# Patient Record
Sex: FEMALE | Race: ASIAN | NOT HISPANIC OR LATINO | URBAN - METROPOLITAN AREA
[De-identification: names, ages, dates, MRNs, and addresses within clinical notes are randomized per-mention and may not be internally consistent; named-entity substitution may affect disease eponyms.]

---

## 2017-01-18 ENCOUNTER — INPATIENT (INPATIENT)
Facility: HOSPITAL | Age: 82
LOS: 4 days | Discharge: ANOTHER HOSPITAL | DRG: 291 | End: 2017-01-23
Attending: INTERNAL MEDICINE | Admitting: INTERNAL MEDICINE
Payer: MEDICARE

## 2017-01-18 VITALS
HEIGHT: 64 IN | DIASTOLIC BLOOD PRESSURE: 57 MMHG | WEIGHT: 149.91 LBS | TEMPERATURE: 97 F | SYSTOLIC BLOOD PRESSURE: 103 MMHG | RESPIRATION RATE: 16 BRPM | OXYGEN SATURATION: 95 % | HEART RATE: 63 BPM

## 2017-01-18 DIAGNOSIS — E11.8 TYPE 2 DIABETES MELLITUS WITH UNSPECIFIED COMPLICATIONS: ICD-10-CM

## 2017-01-18 DIAGNOSIS — S82.892A OTHER FRACTURE OF LEFT LOWER LEG, INITIAL ENCOUNTER FOR CLOSED FRACTURE: Chronic | ICD-10-CM

## 2017-01-18 DIAGNOSIS — I21.4 NON-ST ELEVATION (NSTEMI) MYOCARDIAL INFARCTION: ICD-10-CM

## 2017-01-18 DIAGNOSIS — S42.302S UNSPECIFIED FRACTURE OF SHAFT OF HUMERUS, LEFT ARM, SEQUELA: Chronic | ICD-10-CM

## 2017-01-18 DIAGNOSIS — M79.602 PAIN IN LEFT ARM: ICD-10-CM

## 2017-01-18 DIAGNOSIS — Z41.8 ENCOUNTER FOR OTHER PROCEDURES FOR PURPOSES OTHER THAN REMEDYING HEALTH STATE: ICD-10-CM

## 2017-01-18 DIAGNOSIS — N17.9 ACUTE KIDNEY FAILURE, UNSPECIFIED: ICD-10-CM

## 2017-01-18 DIAGNOSIS — I10 ESSENTIAL (PRIMARY) HYPERTENSION: ICD-10-CM

## 2017-01-18 DIAGNOSIS — H26.9 UNSPECIFIED CATARACT: Chronic | ICD-10-CM

## 2017-01-18 DIAGNOSIS — I50.9 HEART FAILURE, UNSPECIFIED: ICD-10-CM

## 2017-01-18 DIAGNOSIS — R74.0 NONSPECIFIC ELEVATION OF LEVELS OF TRANSAMINASE AND LACTIC ACID DEHYDROGENASE [LDH]: ICD-10-CM

## 2017-01-18 LAB
ALBUMIN SERPL ELPH-MCNC: 2.6 G/DL — LOW (ref 3.5–5)
ALP SERPL-CCNC: 36 U/L — LOW (ref 40–120)
ALT FLD-CCNC: 16 U/L DA — SIGNIFICANT CHANGE UP (ref 10–60)
ANION GAP SERPL CALC-SCNC: 12 MMOL/L — SIGNIFICANT CHANGE UP (ref 5–17)
APPEARANCE UR: CLEAR — SIGNIFICANT CHANGE UP
AST SERPL-CCNC: 43 U/L — HIGH (ref 10–40)
BASOPHILS # BLD AUTO: 0.1 K/UL — SIGNIFICANT CHANGE UP (ref 0–0.2)
BASOPHILS NFR BLD AUTO: 0.6 % — SIGNIFICANT CHANGE UP (ref 0–2)
BILIRUB DIRECT SERPL-MCNC: 0.3 MG/DL — HIGH (ref 0–0.2)
BILIRUB SERPL-MCNC: 1.4 MG/DL — HIGH (ref 0.2–1.2)
BILIRUB UR-MCNC: NEGATIVE — SIGNIFICANT CHANGE UP
BUN SERPL-MCNC: 21 MG/DL — HIGH (ref 7–18)
CALCIUM SERPL-MCNC: 8.6 MG/DL — SIGNIFICANT CHANGE UP (ref 8.4–10.5)
CHLORIDE SERPL-SCNC: 103 MMOL/L — SIGNIFICANT CHANGE UP (ref 96–108)
CK MB BLD-MCNC: <0.8 % — SIGNIFICANT CHANGE UP (ref 0–3.5)
CK MB BLD-MCNC: <1.3 % — SIGNIFICANT CHANGE UP (ref 0–3.5)
CK MB CFR SERPL CALC: <1 NG/ML — SIGNIFICANT CHANGE UP (ref 0–3.6)
CK MB CFR SERPL CALC: <1 NG/ML — SIGNIFICANT CHANGE UP (ref 0–3.6)
CK SERPL-CCNC: 128 U/L — SIGNIFICANT CHANGE UP (ref 21–215)
CK SERPL-CCNC: 79 U/L — SIGNIFICANT CHANGE UP (ref 21–215)
CO2 SERPL-SCNC: 20 MMOL/L — LOW (ref 22–31)
COLOR SPEC: YELLOW — SIGNIFICANT CHANGE UP
CREAT SERPL-MCNC: 1.7 MG/DL — HIGH (ref 0.5–1.3)
DIFF PNL FLD: ABNORMAL
EOSINOPHIL # BLD AUTO: 0.1 K/UL — SIGNIFICANT CHANGE UP (ref 0–0.5)
EOSINOPHIL NFR BLD AUTO: 0.4 % — SIGNIFICANT CHANGE UP (ref 0–6)
GLUCOSE SERPL-MCNC: 129 MG/DL — HIGH (ref 70–99)
GLUCOSE UR QL: NEGATIVE — SIGNIFICANT CHANGE UP
HCT VFR BLD CALC: 45.8 % — HIGH (ref 34.5–45)
HGB BLD-MCNC: 14.4 G/DL — SIGNIFICANT CHANGE UP (ref 11.5–15.5)
KETONES UR-MCNC: NEGATIVE — SIGNIFICANT CHANGE UP
LEUKOCYTE ESTERASE UR-ACNC: ABNORMAL
LYMPHOCYTES # BLD AUTO: 1.1 K/UL — SIGNIFICANT CHANGE UP (ref 1–3.3)
LYMPHOCYTES # BLD AUTO: 8.6 % — LOW (ref 13–44)
MCHC RBC-ENTMCNC: 28.4 PG — SIGNIFICANT CHANGE UP (ref 27–34)
MCHC RBC-ENTMCNC: 31.5 GM/DL — LOW (ref 32–36)
MCV RBC AUTO: 90 FL — SIGNIFICANT CHANGE UP (ref 80–100)
MONOCYTES # BLD AUTO: 1.1 K/UL — HIGH (ref 0–0.9)
MONOCYTES NFR BLD AUTO: 8.7 % — SIGNIFICANT CHANGE UP (ref 2–14)
NEUTROPHILS # BLD AUTO: 10.4 K/UL — HIGH (ref 1.8–7.4)
NEUTROPHILS NFR BLD AUTO: 81.7 % — HIGH (ref 43–77)
NITRITE UR-MCNC: NEGATIVE — SIGNIFICANT CHANGE UP
NT-PROBNP SERPL-SCNC: HIGH PG/ML (ref 0–450)
PH UR: 5 — SIGNIFICANT CHANGE UP (ref 4.8–8)
PLATELET # BLD AUTO: 312 K/UL — SIGNIFICANT CHANGE UP (ref 150–400)
POTASSIUM SERPL-MCNC: 4.7 MMOL/L — SIGNIFICANT CHANGE UP (ref 3.5–5.3)
POTASSIUM SERPL-SCNC: 4.7 MMOL/L — SIGNIFICANT CHANGE UP (ref 3.5–5.3)
PROT SERPL-MCNC: 6.7 G/DL — SIGNIFICANT CHANGE UP (ref 6–8.3)
PROT UR-MCNC: 15
RBC # BLD: 5.09 M/UL — SIGNIFICANT CHANGE UP (ref 3.8–5.2)
RBC # FLD: 13.3 % — SIGNIFICANT CHANGE UP (ref 10.3–14.5)
SODIUM SERPL-SCNC: 135 MMOL/L — SIGNIFICANT CHANGE UP (ref 135–145)
SP GR SPEC: 1.01 — SIGNIFICANT CHANGE UP (ref 1.01–1.02)
TROPONIN I SERPL-MCNC: 0.02 NG/ML — SIGNIFICANT CHANGE UP (ref 0–0.04)
TROPONIN I SERPL-MCNC: 0.04 NG/ML — SIGNIFICANT CHANGE UP (ref 0–0.04)
UROBILINOGEN FLD QL: NEGATIVE — SIGNIFICANT CHANGE UP
WBC # BLD: 12.7 K/UL — HIGH (ref 3.8–10.5)
WBC # FLD AUTO: 12.7 K/UL — HIGH (ref 3.8–10.5)

## 2017-01-18 PROCEDURE — 73060 X-RAY EXAM OF HUMERUS: CPT | Mod: 26,LT

## 2017-01-18 PROCEDURE — 70450 CT HEAD/BRAIN W/O DYE: CPT | Mod: 26

## 2017-01-18 PROCEDURE — 71010: CPT | Mod: 26

## 2017-01-18 PROCEDURE — 73030 X-RAY EXAM OF SHOULDER: CPT | Mod: 26,LT

## 2017-01-18 PROCEDURE — 73110 X-RAY EXAM OF WRIST: CPT | Mod: 26,RT

## 2017-01-18 PROCEDURE — 99285 EMERGENCY DEPT VISIT HI MDM: CPT

## 2017-01-18 RX ORDER — FUROSEMIDE 40 MG
20 TABLET ORAL EVERY 12 HOURS
Qty: 0 | Refills: 0 | Status: DISCONTINUED | OUTPATIENT
Start: 2017-01-18 | End: 2017-01-21

## 2017-01-18 RX ORDER — ACETAMINOPHEN 500 MG
650 TABLET ORAL EVERY 6 HOURS
Qty: 0 | Refills: 0 | Status: DISCONTINUED | OUTPATIENT
Start: 2017-01-18 | End: 2017-01-23

## 2017-01-18 RX ORDER — DEXTROSE 50 % IN WATER 50 %
25 SYRINGE (ML) INTRAVENOUS ONCE
Qty: 0 | Refills: 0 | Status: DISCONTINUED | OUTPATIENT
Start: 2017-01-18 | End: 2017-01-23

## 2017-01-18 RX ORDER — ATORVASTATIN CALCIUM 80 MG/1
20 TABLET, FILM COATED ORAL AT BEDTIME
Qty: 0 | Refills: 0 | Status: DISCONTINUED | OUTPATIENT
Start: 2017-01-18 | End: 2017-01-23

## 2017-01-18 RX ORDER — DEXTROSE 50 % IN WATER 50 %
1 SYRINGE (ML) INTRAVENOUS ONCE
Qty: 0 | Refills: 0 | Status: DISCONTINUED | OUTPATIENT
Start: 2017-01-18 | End: 2017-01-23

## 2017-01-18 RX ORDER — SODIUM CHLORIDE 9 MG/ML
3 INJECTION INTRAMUSCULAR; INTRAVENOUS; SUBCUTANEOUS ONCE
Qty: 0 | Refills: 0 | Status: COMPLETED | OUTPATIENT
Start: 2017-01-18 | End: 2017-01-18

## 2017-01-18 RX ORDER — FUROSEMIDE 40 MG
20 TABLET ORAL DAILY
Qty: 0 | Refills: 0 | Status: DISCONTINUED | OUTPATIENT
Start: 2017-01-18 | End: 2017-01-18

## 2017-01-18 RX ORDER — SODIUM CHLORIDE 9 MG/ML
1000 INJECTION, SOLUTION INTRAVENOUS
Qty: 0 | Refills: 0 | Status: DISCONTINUED | OUTPATIENT
Start: 2017-01-18 | End: 2017-01-23

## 2017-01-18 RX ORDER — HEPARIN SODIUM 5000 [USP'U]/ML
5000 INJECTION INTRAVENOUS; SUBCUTANEOUS EVERY 8 HOURS
Qty: 0 | Refills: 0 | Status: DISCONTINUED | OUTPATIENT
Start: 2017-01-18 | End: 2017-01-23

## 2017-01-18 RX ORDER — GLUCAGON INJECTION, SOLUTION 0.5 MG/.1ML
1 INJECTION, SOLUTION SUBCUTANEOUS ONCE
Qty: 0 | Refills: 0 | Status: DISCONTINUED | OUTPATIENT
Start: 2017-01-18 | End: 2017-01-23

## 2017-01-18 RX ORDER — INSULIN LISPRO 100/ML
VIAL (ML) SUBCUTANEOUS
Qty: 0 | Refills: 0 | Status: DISCONTINUED | OUTPATIENT
Start: 2017-01-18 | End: 2017-01-23

## 2017-01-18 RX ORDER — CARVEDILOL PHOSPHATE 80 MG/1
12.5 CAPSULE, EXTENDED RELEASE ORAL EVERY 12 HOURS
Qty: 0 | Refills: 0 | Status: DISCONTINUED | OUTPATIENT
Start: 2017-01-18 | End: 2017-01-23

## 2017-01-18 RX ORDER — DEXTROSE 50 % IN WATER 50 %
12.5 SYRINGE (ML) INTRAVENOUS ONCE
Qty: 0 | Refills: 0 | Status: DISCONTINUED | OUTPATIENT
Start: 2017-01-18 | End: 2017-01-23

## 2017-01-18 RX ORDER — FUROSEMIDE 40 MG
20 TABLET ORAL ONCE
Qty: 0 | Refills: 0 | Status: COMPLETED | OUTPATIENT
Start: 2017-01-18 | End: 2017-01-18

## 2017-01-18 RX ORDER — KETOROLAC TROMETHAMINE 30 MG/ML
15 SYRINGE (ML) INJECTION EVERY 6 HOURS
Qty: 0 | Refills: 0 | Status: DISCONTINUED | OUTPATIENT
Start: 2017-01-18 | End: 2017-01-19

## 2017-01-18 RX ORDER — ASPIRIN/CALCIUM CARB/MAGNESIUM 324 MG
81 TABLET ORAL DAILY
Qty: 0 | Refills: 0 | Status: DISCONTINUED | OUTPATIENT
Start: 2017-01-18 | End: 2017-01-23

## 2017-01-18 RX ADMIN — Medication 20 MILLIGRAM(S): at 15:33

## 2017-01-18 RX ADMIN — Medication 81 MILLIGRAM(S): at 19:04

## 2017-01-18 RX ADMIN — ATORVASTATIN CALCIUM 20 MILLIGRAM(S): 80 TABLET, FILM COATED ORAL at 23:19

## 2017-01-18 RX ADMIN — SODIUM CHLORIDE 3 MILLILITER(S): 9 INJECTION INTRAMUSCULAR; INTRAVENOUS; SUBCUTANEOUS at 13:03

## 2017-01-18 RX ADMIN — HEPARIN SODIUM 5000 UNIT(S): 5000 INJECTION INTRAVENOUS; SUBCUTANEOUS at 23:19

## 2017-01-18 RX ADMIN — Medication 15 MILLIGRAM(S): at 17:50

## 2017-01-18 RX ADMIN — Medication 15 MILLIGRAM(S): at 17:03

## 2017-01-18 RX ADMIN — CARVEDILOL PHOSPHATE 12.5 MILLIGRAM(S): 80 CAPSULE, EXTENDED RELEASE ORAL at 19:04

## 2017-01-18 RX ADMIN — Medication 20 MILLIGRAM(S): at 19:03

## 2017-01-18 NOTE — H&P ADULT. - PROBLEM SELECTOR PLAN 5
unlikely 2/2 statin myopathy as CK normal.  possibly 2/2 callus around fracture.  patient is nontender on exam, no erythema or warmth on exam  - pain management with toradol, hold on IV opiates given low normal BP

## 2017-01-18 NOTE — ED PROVIDER NOTE - PROGRESS NOTE DETAILS
Spoke to Dr. Howe, who told that if pt was medically clear, and no enzymes were made at the ED, pt needs to follow up w/ Dr. Howe.  reports the TWI is old. new labs reported to dr. shen, agree pt is to be admitted. Spoke to Dr. shen, who told that if pt was medically clear, and no enzymes were made at the ED, pt needs to follow up w/ Dr. Howe.  reports the TWI is old.

## 2017-01-18 NOTE — H&P ADULT. - PROBLEM SELECTOR PLAN 1
hypoxic, congestion on CXR, elevated BNP consistent with CHF. as per patient's cardiologist, Dr Holman, [phone], patient had stress test last year which showed EF60s?, no ischemia.  New onset CHF?, likely acute on chronic diastolic heart failure as patient with NICHOLS for 'long time' and orthopnea. possibly 2/2 NSTEMI with TWI in anterior leads.  Patient is responding to lasix 20mg with dilute-looking urine, not tachypneic, no respiratory distress, 96-97% sat on 2LNC  - pedraza, lasix 20mg IV q12  - telemetry, ASA, statin, c/w coreg, trend troponins  - TTE

## 2017-01-18 NOTE — H&P ADULT. - PROBLEM SELECTOR PLAN 2
unknown baseline creatinine, possibly 2/2 JOE on CKD as patient with HTN and DM (however low protein noted on UA), likely pre-renal 2/2 low flow state in CHF.    - c/w lasix IV q12, hold ARB  - send urine protein/creatinine ratio  - renal ultrasound   - Dr Chan consulted

## 2017-01-18 NOTE — ED PROVIDER NOTE - NS ED MD SCRIBE ATTENDING SCRIBE SECTIONS
PAST MEDICAL/SURGICAL/SOCIAL HISTORY/VITAL SIGNS( Pullset)/HISTORY OF PRESENT ILLNESS/REVIEW OF SYSTEMS/PHYSICAL EXAM/HIV/DISPOSITION

## 2017-01-18 NOTE — ED PROVIDER NOTE - CARE PLAN
Principal Discharge DX:	NSTEMI (non-ST elevated myocardial infarction)  Secondary Diagnosis:	Congestive heart failure

## 2017-01-18 NOTE — ED PROVIDER NOTE - PMH
DM (diabetes mellitus)    HLD (hyperlipidemia)    HTN (hypertension)    TIA (transient ischemic attack)

## 2017-01-18 NOTE — H&P ADULT. - HISTORY OF PRESENT ILLNESS
83yo female Mandarin and Cantonese-speaking from home, lives alone, walks with walker, former smoker, 2 sons at bedside to translate, PMH TIA (8 years ago, no residual deficits, on ASA and plavix), HTN, DM2, s/p left humerus fracture s/p roshan p/w left arm pain x 10 days and dry cough x 1 week. Patient went to urgent care today because left arm pain did not respond to OTC at home.  At urgent care, they noted that patient was hypoxic with 02 sat 85%, and patient BIBA.  As per ED physician who spoke with patient's cardiologist - patient had nuclear stress test last year which showed no reversible ischemia and EF 60s%.  ROS + orthopnea, NICHOLS. Denies fever, chills, changes in vision, nasal congestion, sinus pain, sore throat, chest pain, N/V, diarrhea, abdominal pain, dysuria, rectal bleeding.

## 2017-01-18 NOTE — ED PROVIDER NOTE - OBJECTIVE STATEMENT
81 y/o F pt w/ PMHx of HTN, HLD, DM, TIA 8 years ago, and arthritis, on Plavix BIB EMS to the ED s/p mechanical fall onset today. As per son, pt was initially c/o L UE pain x1 week. Pt was on her way to the Urgent Care today when pt had a mechanical fall and questionable head trauma. As per son, pt soon became short of breath and started to c/o pain to the R wrist after the fall. As the Urgent Care, pt was found to have an O2 stat in the 80s. Son reports, Pt had a benign nuclear stress test 6 months ago. Pt denies CP, nausea, vomiting, dizziness, HA or any other complaints. NKDA.

## 2017-01-18 NOTE — H&P ADULT. - ASSESSMENT
83yo female Mandarin and Cantonese-speaking from home, lives alone, walks with walker, former smoker, 2 sons at bedside to translate, PMH TIA (8 years ago, no residual deficits, on ASA and plavix), HTN, DM2, s/p left humerus fracture s/p roshan p/w left arm pain x 10 days, dry cough x 1 week, found to be hypoxic at urgent care likely 2/2 acute congestive heart failure

## 2017-01-18 NOTE — H&P ADULT. - PSH
Cataract, acquired    Fracture dislocation of left ankle joint    Fracture of left upper extremity, sequela

## 2017-01-18 NOTE — ED ADULT NURSE NOTE - OBJECTIVE STATEMENT
Pt AOx3. C/ of left arm pain, as per son his helping his mother outside. Pt tripped and fall. Pt is using rolling walker. No open wound to head. Denies loc. Pt AOx3. C/ of left arm pain, as per son his helping his mother outside. Pt tripped and fall. Pt is using rolling walker. No open wound to head. Pt reported injury on left arm 20 years ago. Denies loc.

## 2017-01-18 NOTE — ED PROVIDER NOTE - MEDICAL DECISION MAKING DETAILS
81 y/o F pt w/ PMHx of HTN, HLD, DM and TIA p/w SOB s/p mechanical fall w/ L UE pain and R wrist pain. Will check CT head given pt is on Plavix, XR R wrist, L hand, CXR and reassess.

## 2017-01-19 LAB
ALBUMIN SERPL ELPH-MCNC: 2.5 G/DL — LOW (ref 3.5–5)
ALP SERPL-CCNC: 33 U/L — LOW (ref 40–120)
ALT FLD-CCNC: 14 U/L DA — SIGNIFICANT CHANGE UP (ref 10–60)
ANION GAP SERPL CALC-SCNC: 10 MMOL/L — SIGNIFICANT CHANGE UP (ref 5–17)
AST SERPL-CCNC: 22 U/L — SIGNIFICANT CHANGE UP (ref 10–40)
BASOPHILS # BLD AUTO: 0.1 K/UL — SIGNIFICANT CHANGE UP (ref 0–0.2)
BASOPHILS NFR BLD AUTO: 0.8 % — SIGNIFICANT CHANGE UP (ref 0–2)
BILIRUB SERPL-MCNC: 1.1 MG/DL — SIGNIFICANT CHANGE UP (ref 0.2–1.2)
BUN SERPL-MCNC: 25 MG/DL — HIGH (ref 7–18)
CALCIUM SERPL-MCNC: 8.4 MG/DL — SIGNIFICANT CHANGE UP (ref 8.4–10.5)
CHLORIDE SERPL-SCNC: 104 MMOL/L — SIGNIFICANT CHANGE UP (ref 96–108)
CHOLEST SERPL-MCNC: 108 MG/DL — SIGNIFICANT CHANGE UP (ref 10–199)
CK MB BLD-MCNC: <1.7 % — SIGNIFICANT CHANGE UP (ref 0–3.5)
CK MB BLD-MCNC: <2.1 % — SIGNIFICANT CHANGE UP (ref 0–3.5)
CK MB CFR SERPL CALC: <1 NG/ML — SIGNIFICANT CHANGE UP (ref 0–3.6)
CK MB CFR SERPL CALC: <1 NG/ML — SIGNIFICANT CHANGE UP (ref 0–3.6)
CK SERPL-CCNC: 47 U/L — SIGNIFICANT CHANGE UP (ref 21–215)
CK SERPL-CCNC: 58 U/L — SIGNIFICANT CHANGE UP (ref 21–215)
CO2 SERPL-SCNC: 25 MMOL/L — SIGNIFICANT CHANGE UP (ref 22–31)
CREAT SERPL-MCNC: 1.7 MG/DL — HIGH (ref 0.5–1.3)
EOSINOPHIL # BLD AUTO: 0.2 K/UL — SIGNIFICANT CHANGE UP (ref 0–0.5)
EOSINOPHIL NFR BLD AUTO: 2.4 % — SIGNIFICANT CHANGE UP (ref 0–6)
FERRITIN SERPL-MCNC: 244.7 NG/ML — HIGH (ref 15–150)
GLUCOSE SERPL-MCNC: 74 MG/DL — SIGNIFICANT CHANGE UP (ref 70–99)
HBA1C BLD-MCNC: 6 % — HIGH (ref 4–5.6)
HBA1C BLD-MCNC: 6 % — HIGH (ref 4–5.6)
HCT VFR BLD CALC: 42.7 % — SIGNIFICANT CHANGE UP (ref 34.5–45)
HDLC SERPL-MCNC: 36 MG/DL — LOW (ref 40–125)
HGB BLD-MCNC: 13.8 G/DL — SIGNIFICANT CHANGE UP (ref 11.5–15.5)
IRON SATN MFR SERPL: 10 % — LOW (ref 15–50)
IRON SATN MFR SERPL: 23 UG/DL — LOW (ref 40–150)
LIPID PNL WITH DIRECT LDL SERPL: 43 MG/DL — SIGNIFICANT CHANGE UP
LYMPHOCYTES # BLD AUTO: 1.5 K/UL — SIGNIFICANT CHANGE UP (ref 1–3.3)
LYMPHOCYTES # BLD AUTO: 17.1 % — SIGNIFICANT CHANGE UP (ref 13–44)
MAGNESIUM SERPL-MCNC: 1.5 MG/DL — LOW (ref 1.8–2.4)
MCHC RBC-ENTMCNC: 28.6 PG — SIGNIFICANT CHANGE UP (ref 27–34)
MCHC RBC-ENTMCNC: 32.2 GM/DL — SIGNIFICANT CHANGE UP (ref 32–36)
MCV RBC AUTO: 88.6 FL — SIGNIFICANT CHANGE UP (ref 80–100)
MONOCYTES # BLD AUTO: 1.2 K/UL — HIGH (ref 0–0.9)
MONOCYTES NFR BLD AUTO: 13.3 % — SIGNIFICANT CHANGE UP (ref 2–14)
NEUTROPHILS # BLD AUTO: 5.8 K/UL — SIGNIFICANT CHANGE UP (ref 1.8–7.4)
NEUTROPHILS NFR BLD AUTO: 66.5 % — SIGNIFICANT CHANGE UP (ref 43–77)
PHOSPHATE SERPL-MCNC: 4.1 MG/DL — SIGNIFICANT CHANGE UP (ref 2.5–4.5)
PLATELET # BLD AUTO: 276 K/UL — SIGNIFICANT CHANGE UP (ref 150–400)
POTASSIUM SERPL-MCNC: 3.5 MMOL/L — SIGNIFICANT CHANGE UP (ref 3.5–5.3)
POTASSIUM SERPL-SCNC: 3.5 MMOL/L — SIGNIFICANT CHANGE UP (ref 3.5–5.3)
PROT SERPL-MCNC: 6.2 G/DL — SIGNIFICANT CHANGE UP (ref 6–8.3)
RBC # BLD: 4.51 M/UL — SIGNIFICANT CHANGE UP (ref 3.8–5.2)
RBC # BLD: 4.81 M/UL — SIGNIFICANT CHANGE UP (ref 3.8–5.2)
RBC # FLD: 13.3 % — SIGNIFICANT CHANGE UP (ref 10.3–14.5)
RETICS #: 46 K/UL — SIGNIFICANT CHANGE UP (ref 25–125)
RETICS/RBC NFR: 1 % — SIGNIFICANT CHANGE UP (ref 0.5–2.5)
SODIUM SERPL-SCNC: 139 MMOL/L — SIGNIFICANT CHANGE UP (ref 135–145)
TIBC SERPL-MCNC: 227 UG/DL — LOW (ref 250–450)
TOTAL CHOLESTEROL/HDL RATIO MEASUREMENT: 3 RATIO — LOW (ref 3.3–7.1)
TRIGL SERPL-MCNC: 145 MG/DL — SIGNIFICANT CHANGE UP (ref 10–149)
TROPONIN I SERPL-MCNC: 0.02 NG/ML — SIGNIFICANT CHANGE UP (ref 0–0.04)
TROPONIN I SERPL-MCNC: 0.02 NG/ML — SIGNIFICANT CHANGE UP (ref 0–0.04)
UIBC SERPL-MCNC: 204 UG/DL — SIGNIFICANT CHANGE UP (ref 110–370)
WBC # BLD: 8.6 K/UL — SIGNIFICANT CHANGE UP (ref 3.8–10.5)
WBC # FLD AUTO: 8.6 K/UL — SIGNIFICANT CHANGE UP (ref 3.8–10.5)

## 2017-01-19 PROCEDURE — 76775 US EXAM ABDO BACK WALL LIM: CPT | Mod: 26

## 2017-01-19 RX ORDER — CEFTRIAXONE 500 MG/1
INJECTION, POWDER, FOR SOLUTION INTRAMUSCULAR; INTRAVENOUS
Qty: 0 | Refills: 0 | Status: DISCONTINUED | OUTPATIENT
Start: 2017-01-19 | End: 2017-01-21

## 2017-01-19 RX ORDER — AZITHROMYCIN 500 MG/1
TABLET, FILM COATED ORAL
Qty: 0 | Refills: 0 | Status: DISCONTINUED | OUTPATIENT
Start: 2017-01-19 | End: 2017-01-21

## 2017-01-19 RX ORDER — POTASSIUM CHLORIDE 20 MEQ
20 PACKET (EA) ORAL
Qty: 0 | Refills: 0 | Status: COMPLETED | OUTPATIENT
Start: 2017-01-19 | End: 2017-01-19

## 2017-01-19 RX ORDER — TRAMADOL HYDROCHLORIDE 50 MG/1
50 TABLET ORAL
Qty: 0 | Refills: 0 | Status: DISCONTINUED | OUTPATIENT
Start: 2017-01-19 | End: 2017-01-23

## 2017-01-19 RX ORDER — CEFTRIAXONE 500 MG/1
1 INJECTION, POWDER, FOR SOLUTION INTRAMUSCULAR; INTRAVENOUS EVERY 24 HOURS
Qty: 0 | Refills: 0 | Status: DISCONTINUED | OUTPATIENT
Start: 2017-01-20 | End: 2017-01-21

## 2017-01-19 RX ORDER — MORPHINE SULFATE 50 MG/1
1 CAPSULE, EXTENDED RELEASE ORAL ONCE
Qty: 0 | Refills: 0 | Status: DISCONTINUED | OUTPATIENT
Start: 2017-01-19 | End: 2017-01-19

## 2017-01-19 RX ORDER — MAGNESIUM SULFATE 500 MG/ML
1 VIAL (ML) INJECTION ONCE
Qty: 0 | Refills: 0 | Status: COMPLETED | OUTPATIENT
Start: 2017-01-19 | End: 2017-01-19

## 2017-01-19 RX ORDER — AZITHROMYCIN 500 MG/1
500 TABLET, FILM COATED ORAL ONCE
Qty: 0 | Refills: 0 | Status: COMPLETED | OUTPATIENT
Start: 2017-01-19 | End: 2017-01-19

## 2017-01-19 RX ORDER — CEFTRIAXONE 500 MG/1
1 INJECTION, POWDER, FOR SOLUTION INTRAMUSCULAR; INTRAVENOUS ONCE
Qty: 0 | Refills: 0 | Status: COMPLETED | OUTPATIENT
Start: 2017-01-19 | End: 2017-01-19

## 2017-01-19 RX ORDER — AZITHROMYCIN 500 MG/1
500 TABLET, FILM COATED ORAL EVERY 24 HOURS
Qty: 0 | Refills: 0 | Status: DISCONTINUED | OUTPATIENT
Start: 2017-01-20 | End: 2017-01-21

## 2017-01-19 RX ADMIN — ATORVASTATIN CALCIUM 20 MILLIGRAM(S): 80 TABLET, FILM COATED ORAL at 21:49

## 2017-01-19 RX ADMIN — CARVEDILOL PHOSPHATE 12.5 MILLIGRAM(S): 80 CAPSULE, EXTENDED RELEASE ORAL at 18:07

## 2017-01-19 RX ADMIN — Medication 100 GRAM(S): at 11:25

## 2017-01-19 RX ADMIN — MORPHINE SULFATE 1 MILLIGRAM(S): 50 CAPSULE, EXTENDED RELEASE ORAL at 20:07

## 2017-01-19 RX ADMIN — HEPARIN SODIUM 5000 UNIT(S): 5000 INJECTION INTRAVENOUS; SUBCUTANEOUS at 06:01

## 2017-01-19 RX ADMIN — Medication 81 MILLIGRAM(S): at 13:00

## 2017-01-19 RX ADMIN — HEPARIN SODIUM 5000 UNIT(S): 5000 INJECTION INTRAVENOUS; SUBCUTANEOUS at 13:01

## 2017-01-19 RX ADMIN — Medication 20 MILLIEQUIVALENT(S): at 11:25

## 2017-01-19 RX ADMIN — MORPHINE SULFATE 1 MILLIGRAM(S): 50 CAPSULE, EXTENDED RELEASE ORAL at 20:30

## 2017-01-19 RX ADMIN — AZITHROMYCIN 250 MILLIGRAM(S): 500 TABLET, FILM COATED ORAL at 20:56

## 2017-01-19 RX ADMIN — HEPARIN SODIUM 5000 UNIT(S): 5000 INJECTION INTRAVENOUS; SUBCUTANEOUS at 21:49

## 2017-01-19 RX ADMIN — Medication 20 MILLIEQUIVALENT(S): at 13:01

## 2017-01-19 RX ADMIN — Medication 20 MILLIGRAM(S): at 18:07

## 2017-01-19 RX ADMIN — TRAMADOL HYDROCHLORIDE 50 MILLIGRAM(S): 50 TABLET ORAL at 19:44

## 2017-01-19 RX ADMIN — CEFTRIAXONE 100 GRAM(S): 500 INJECTION, POWDER, FOR SOLUTION INTRAMUSCULAR; INTRAVENOUS at 20:55

## 2017-01-19 RX ADMIN — Medication 20 MILLIGRAM(S): at 06:00

## 2017-01-19 RX ADMIN — CARVEDILOL PHOSPHATE 12.5 MILLIGRAM(S): 80 CAPSULE, EXTENDED RELEASE ORAL at 06:00

## 2017-01-19 RX ADMIN — TRAMADOL HYDROCHLORIDE 50 MILLIGRAM(S): 50 TABLET ORAL at 18:39

## 2017-01-19 RX ADMIN — TRAMADOL HYDROCHLORIDE 50 MILLIGRAM(S): 50 TABLET ORAL at 11:25

## 2017-01-19 RX ADMIN — TRAMADOL HYDROCHLORIDE 50 MILLIGRAM(S): 50 TABLET ORAL at 12:10

## 2017-01-19 NOTE — DISCHARGE NOTE ADULT - HOSPITAL COURSE
83yo female Mandarin and Cantonese-speaking from home, lives alone, walks with walker, former smoker, 2 sons at bedside to translate, PMH TIA (8 years ago, no residual deficits, on ASA and plavix), HTN, DM2, s/p left humerus fracture s/p roshan p/w left arm pain x 10 days and dry cough x 1 week. Patient went to urgent care today because left arm pain did not respond to OTC at home.  At urgent care, they noted that patient was hypoxic with 02 sat 85%, and patient BIBA.  As per ED physician who spoke with patient's cardiologist - patient had nuclear stress test last year which showed no reversible ischemia and EF 60s%.  ROS + orthopnea, NICHOLS. Denies fever, chills, changes in vision, nasal congestion, sinus pain, sore throat, chest pain, N/V, diarrhea, abdominal pain, dysuria, rectal bleeding.     In the hospital, patient was evaluated for her symptoms. It was found to be secondary to congestive heart failure with preserved ejection fraction. You were diuresed with lasix. Telemetry showed no evidence of cardiac arrhythmias. You were medically managed with losartan, coreg and statin. However, on echocardiogram you were found to have severe pulmonary hypertension which was not present on echocardiogram or stress test performed 1.5 years ago. Due to the significant changes, patient will be transferred to [facility] for cardiac catheterization. There was concern for infection due to history of pt. Patient started on Rocephin/Azithromycin but CT scan showed no evidence of consolidation. Patient was positive UA but was asymptomatic therefore it was asymptomatic bacturia. BP was stable on meds. Ortho was consulted for the Left arm which had a hardware failure but arm was healing and there was no need for repeat surgical intervention. Patient is stable for transfer.

## 2017-01-19 NOTE — DISCHARGE NOTE ADULT - NS AS DC HF EDUCATION INSTRUCTIONS
Report weight gain of 2 or more pounds in one day or 3 or more pounds in one week, worsening shortness of breath, fatigue, weakness, increased swelling of hands and feet to primary care provider/Monitor Weight Daily/Low salt diet/Activities as tolerated/Call Primary Care Provider for follow-up after discharge

## 2017-01-19 NOTE — PHYSICAL THERAPY INITIAL EVALUATION ADULT - CRITERIA FOR SKILLED THERAPEUTIC INTERVENTIONS
rehab potential/functional limitations in following categories/risk reduction/prevention/impairments found

## 2017-01-19 NOTE — DISCHARGE NOTE ADULT - PLAN OF CARE
To get cardiac cath at [place] Please get cardiac cath at [facility] to evaluate pulmonary hypertension and recent change in cardiac function. No more lasix diuresis for now, follow up with reccs of cardiologist at [facility]. Please continue with losartan, coreg, statin. Resolved You had some acute kidney injury secondary to heart failure, it was resolved with lasix diuresis. Please monitor kidney function at [facility] Control blood pressure Stay on current BP regimen and f/u cardio recs at [facility] Stable fx Take pain control. The hardware failed but your fracture is healing. Please be careful with the use of your left arm. No need for surgical intervention at this time. Control blood sugar Continue to use humalog sliding scale at the hospital and resume Janumet on discharge.

## 2017-01-19 NOTE — DISCHARGE NOTE ADULT - SECONDARY DIAGNOSIS.
JOE (acute kidney injury) HTN (hypertension) Fracture of left upper extremity, sequela DM (diabetes mellitus)

## 2017-01-19 NOTE — DISCHARGE NOTE ADULT - MEDICATION SUMMARY - MEDICATIONS TO STOP TAKING
I will STOP taking the medications listed below when I get home from the hospital:    fenofibrate 67 mg oral capsule  -- 1 cap(s) by mouth once a day    Nifedical XL 30 mg oral tablet, extended release  -- 1 tab(s) by mouth once a day    simvastatin 40 mg oral tablet  -- 1 tab(s) by mouth once a day (at bedtime)

## 2017-01-19 NOTE — DISCHARGE NOTE ADULT - PATIENT PORTAL LINK FT
“You can access the Follow[employer] Patient Portal, offered by [facility], by registering with the following website: [email or web address]”

## 2017-01-19 NOTE — DISCHARGE NOTE ADULT - MEDICATION SUMMARY - MEDICATIONS TO TAKE
I will START or STAY ON the medications listed below when I get home from the hospital:    acetaminophen 325 mg oral tablet  -- 2 tab(s) by mouth every 6 hours, As needed, Mild Pain (1 - 3)  -- Indication: For Pain of left upper extremity    traMADol 50 mg oral tablet  -- 1 tab(s) by mouth 2 times a day, As needed, Moderate Pain (4 - 6)  -- Indication: For Pain of left upper extremity    aspirin 81 mg oral tablet, chewable  -- 1 tab(s) by mouth once a day  -- Indication: For CHF    losartan 25 mg oral tablet  -- 1 tab(s) by mouth once a day  -- Indication: For CHF    insulin lispro 100 units/mL subcutaneous solution  --  subcutaneous 4 times a day (before meals and at bedtime); 1 Unit(s) if Glucose 151 - 200  2 Unit(s) if Glucose 201 - 250  3 Unit(s) if Glucose 251 - 300  4 Unit(s) if Glucose 301 - 350  5 Unit(s) if Glucose 351 - 400  6 Unit(s) if Glucose GREATER THAN 400  -- Indication: For DM (diabetes mellitus)    atorvastatin 20 mg oral tablet  -- 1 tab(s) by mouth once a day (at bedtime)  -- Indication: For Congestive heart failure    Plavix 75 mg oral tablet  -- 1 tab(s) by mouth once a day  -- Indication: For Congestive heart failure    carvedilol 12.5 mg oral tablet  -- 1 tab(s) by mouth every 12 hours  -- Indication: For Congestive heart failure    amLODIPine 2.5 mg oral tablet  -- 1 tab(s) by mouth once a day  -- Indication: For Pulm HTN    ferrous sulfate 325 mg (65 mg elemental iron) oral tablet  -- 1 tab(s) by mouth 2 times a day  -- Indication: For Anemia    docusate sodium 100 mg oral capsule  -- 1 cap(s) by mouth 2 times a day  -- Indication: For Anemia    pantoprazole 40 mg oral delayed release tablet  -- 1 tab(s) by mouth once a day (before a meal)  -- Indication: For Prophylactic measure    ascorbic acid 500 mg oral tablet  -- 1 tab(s) by mouth once a day  -- Indication: For Anemia

## 2017-01-19 NOTE — DISCHARGE NOTE ADULT - CARE PLAN
Principal Discharge DX:	Congestive heart failure  Goal:	To get cardiac cath at [place]  Instructions for follow-up, activity and diet:	Please get cardiac cath at [facility] to evaluate pulmonary hypertension and recent change in cardiac function. No more lasix diuresis for now, follow up with reccs of cardiologist at [facility]. Please continue with losartan, coreg, statin.  Secondary Diagnosis:	JOE (acute kidney injury)  Goal:	Resolved  Instructions for follow-up, activity and diet:	You had some acute kidney injury secondary to heart failure, it was resolved with lasix diuresis. Please monitor kidney function at [facility]  Secondary Diagnosis:	HTN (hypertension)  Goal:	Control blood pressure  Instructions for follow-up, activity and diet:	Stay on current BP regimen and f/u cardio recs at [facility]  Secondary Diagnosis:	Fracture of left upper extremity, sequela  Goal:	Stable fx  Instructions for follow-up, activity and diet:	Take pain control. The hardware failed but your fracture is healing. Please be careful with the use of your left arm. No need for surgical intervention at this time.  Secondary Diagnosis:	DM (diabetes mellitus)  Goal:	Control blood sugar  Instructions for follow-up, activity and diet:	Continue to use humalog sliding scale at the hospital and resume Janumet on discharge.

## 2017-01-19 NOTE — DISCHARGE NOTE ADULT - CARE PROVIDER_API CALL
Gena Daniels), Cardiology; Internal Medicine  [address] [place] [id number]  Phone: [phone]  Fax: [phone]

## 2017-01-19 NOTE — PHYSICAL THERAPY INITIAL EVALUATION ADULT - ACTIVE RANGE OF MOTION EXAMINATION, REHAB EVAL
bilateral  lower extremity Active ROM was WFL (within functional limits)/L shoulder flexion 0 to 30 degrees,abduction 0 to 25 degrees/Right UE Active ROM was WFL (within functional limits)/deficits as listed below

## 2017-01-19 NOTE — DISCHARGE NOTE ADULT - MEDICATION SUMMARY - MEDICATIONS TO CHANGE
I will SWITCH the dose or number of times a day I take the medications listed below when I get home from the hospital:    losartan 100 mg oral tablet  -- 1 tab(s) by mouth once a day

## 2017-01-19 NOTE — PHYSICAL THERAPY INITIAL EVALUATION ADULT - DIAGNOSIS, PT EVAL
Patient presented with pain on L shoulder,limitation on L shoulder ROM for flexion and abduction,impaired balance and was unsteady during ambulation

## 2017-01-20 LAB
ALBUMIN SERPL ELPH-MCNC: 2.6 G/DL — LOW (ref 3.5–5)
ALP SERPL-CCNC: 39 U/L — LOW (ref 40–120)
ALT FLD-CCNC: 14 U/L DA — SIGNIFICANT CHANGE UP (ref 10–60)
ANION GAP SERPL CALC-SCNC: 10 MMOL/L — SIGNIFICANT CHANGE UP (ref 5–17)
APPEARANCE UR: CLEAR — SIGNIFICANT CHANGE UP
AST SERPL-CCNC: 26 U/L — SIGNIFICANT CHANGE UP (ref 10–40)
BILIRUB SERPL-MCNC: 0.8 MG/DL — SIGNIFICANT CHANGE UP (ref 0.2–1.2)
BILIRUB UR-MCNC: NEGATIVE — SIGNIFICANT CHANGE UP
BUN SERPL-MCNC: 25 MG/DL — HIGH (ref 7–18)
CALCIUM SERPL-MCNC: 8.7 MG/DL — SIGNIFICANT CHANGE UP (ref 8.4–10.5)
CHLORIDE SERPL-SCNC: 102 MMOL/L — SIGNIFICANT CHANGE UP (ref 96–108)
CO2 SERPL-SCNC: 26 MMOL/L — SIGNIFICANT CHANGE UP (ref 22–31)
COLOR SPEC: YELLOW — SIGNIFICANT CHANGE UP
CREAT ?TM UR-MCNC: 25 MG/DL — SIGNIFICANT CHANGE UP
CREAT ?TM UR-MCNC: 46 MG/DL — SIGNIFICANT CHANGE UP
CREAT SERPL-MCNC: 1.48 MG/DL — HIGH (ref 0.5–1.3)
DIFF PNL FLD: ABNORMAL
GLUCOSE SERPL-MCNC: 80 MG/DL — SIGNIFICANT CHANGE UP (ref 70–99)
GLUCOSE UR QL: NEGATIVE — SIGNIFICANT CHANGE UP
KETONES UR-MCNC: NEGATIVE — SIGNIFICANT CHANGE UP
LEUKOCYTE ESTERASE UR-ACNC: ABNORMAL
MICROALBUMIN UR-MCNC: 6.1 MG/DL — SIGNIFICANT CHANGE UP
MICROALBUMIN/CREAT UR-RTO: 248 UG/MG — HIGH (ref 0–30)
NITRITE UR-MCNC: NEGATIVE — SIGNIFICANT CHANGE UP
PH UR: 5 — SIGNIFICANT CHANGE UP (ref 4.8–8)
POTASSIUM SERPL-MCNC: 4 MMOL/L — SIGNIFICANT CHANGE UP (ref 3.5–5.3)
POTASSIUM SERPL-SCNC: 4 MMOL/L — SIGNIFICANT CHANGE UP (ref 3.5–5.3)
PROT ?TM UR-MCNC: 12 MG/DL — SIGNIFICANT CHANGE UP (ref 0–12)
PROT SERPL-MCNC: 6 G/DL — SIGNIFICANT CHANGE UP (ref 6–8.3)
PROT SERPL-MCNC: 6 G/DL — SIGNIFICANT CHANGE UP (ref 6–8.3)
PROT SERPL-MCNC: 6.7 G/DL — SIGNIFICANT CHANGE UP (ref 6–8.3)
PROT UR-MCNC: NEGATIVE — SIGNIFICANT CHANGE UP
SODIUM SERPL-SCNC: 138 MMOL/L — SIGNIFICANT CHANGE UP (ref 135–145)
SP GR SPEC: 1.01 — SIGNIFICANT CHANGE UP (ref 1.01–1.02)
TSH SERPL-MCNC: 2.26 UU/ML — SIGNIFICANT CHANGE UP (ref 0.34–4.82)
UROBILINOGEN FLD QL: NEGATIVE — SIGNIFICANT CHANGE UP
VIT D25+D1,25 OH+D1,25 PNL SERPL-MCNC: 49 PG/ML — SIGNIFICANT CHANGE UP (ref 19.9–79.3)

## 2017-01-20 PROCEDURE — 71250 CT THORAX DX C-: CPT | Mod: 26

## 2017-01-20 RX ORDER — ASCORBIC ACID 60 MG
500 TABLET,CHEWABLE ORAL DAILY
Qty: 0 | Refills: 0 | Status: DISCONTINUED | OUTPATIENT
Start: 2017-01-20 | End: 2017-01-23

## 2017-01-20 RX ORDER — DOCUSATE SODIUM 100 MG
100 CAPSULE ORAL
Qty: 0 | Refills: 0 | Status: DISCONTINUED | OUTPATIENT
Start: 2017-01-20 | End: 2017-01-23

## 2017-01-20 RX ORDER — PANTOPRAZOLE SODIUM 20 MG/1
40 TABLET, DELAYED RELEASE ORAL
Qty: 0 | Refills: 0 | Status: DISCONTINUED | OUTPATIENT
Start: 2017-01-20 | End: 2017-01-23

## 2017-01-20 RX ORDER — FERROUS SULFATE 325(65) MG
325 TABLET ORAL
Qty: 0 | Refills: 0 | Status: DISCONTINUED | OUTPATIENT
Start: 2017-01-20 | End: 2017-01-23

## 2017-01-20 RX ADMIN — CARVEDILOL PHOSPHATE 12.5 MILLIGRAM(S): 80 CAPSULE, EXTENDED RELEASE ORAL at 17:44

## 2017-01-20 RX ADMIN — AZITHROMYCIN 250 MILLIGRAM(S): 500 TABLET, FILM COATED ORAL at 21:32

## 2017-01-20 RX ADMIN — CARVEDILOL PHOSPHATE 12.5 MILLIGRAM(S): 80 CAPSULE, EXTENDED RELEASE ORAL at 06:20

## 2017-01-20 RX ADMIN — Medication 325 MILLIGRAM(S): at 17:44

## 2017-01-20 RX ADMIN — Medication 500 MILLIGRAM(S): at 12:35

## 2017-01-20 RX ADMIN — HEPARIN SODIUM 5000 UNIT(S): 5000 INJECTION INTRAVENOUS; SUBCUTANEOUS at 15:07

## 2017-01-20 RX ADMIN — Medication 20 MILLIGRAM(S): at 17:44

## 2017-01-20 RX ADMIN — CEFTRIAXONE 100 GRAM(S): 500 INJECTION, POWDER, FOR SOLUTION INTRAMUSCULAR; INTRAVENOUS at 21:32

## 2017-01-20 RX ADMIN — HEPARIN SODIUM 5000 UNIT(S): 5000 INJECTION INTRAVENOUS; SUBCUTANEOUS at 21:32

## 2017-01-20 RX ADMIN — Medication 81 MILLIGRAM(S): at 12:35

## 2017-01-20 RX ADMIN — ATORVASTATIN CALCIUM 20 MILLIGRAM(S): 80 TABLET, FILM COATED ORAL at 21:32

## 2017-01-20 RX ADMIN — Medication 20 MILLIGRAM(S): at 06:20

## 2017-01-20 RX ADMIN — HEPARIN SODIUM 5000 UNIT(S): 5000 INJECTION INTRAVENOUS; SUBCUTANEOUS at 06:20

## 2017-01-20 RX ADMIN — Medication 100 MILLIGRAM(S): at 17:44

## 2017-01-21 LAB
ALBUMIN SERPL ELPH-MCNC: 2.6 G/DL — LOW (ref 3.5–5)
ALP SERPL-CCNC: 41 U/L — SIGNIFICANT CHANGE UP (ref 40–120)
ALT FLD-CCNC: 12 U/L DA — SIGNIFICANT CHANGE UP (ref 10–60)
ANION GAP SERPL CALC-SCNC: 10 MMOL/L — SIGNIFICANT CHANGE UP (ref 5–17)
AST SERPL-CCNC: 21 U/L — SIGNIFICANT CHANGE UP (ref 10–40)
BILIRUB SERPL-MCNC: 0.9 MG/DL — SIGNIFICANT CHANGE UP (ref 0.2–1.2)
BUN SERPL-MCNC: 21 MG/DL — HIGH (ref 7–18)
CALCIUM SERPL-MCNC: 8.7 MG/DL — SIGNIFICANT CHANGE UP (ref 8.4–10.5)
CHLORIDE SERPL-SCNC: 99 MMOL/L — SIGNIFICANT CHANGE UP (ref 96–108)
CO2 SERPL-SCNC: 27 MMOL/L — SIGNIFICANT CHANGE UP (ref 22–31)
CREAT SERPL-MCNC: 1.29 MG/DL — SIGNIFICANT CHANGE UP (ref 0.5–1.3)
FLUAV SPEC QL CULT: NEGATIVE — SIGNIFICANT CHANGE UP
FLUBV AG SPEC QL IA: NEGATIVE — SIGNIFICANT CHANGE UP
GLUCOSE SERPL-MCNC: 102 MG/DL — HIGH (ref 70–99)
GRAM STN FLD: SIGNIFICANT CHANGE UP
HCT VFR BLD CALC: 45.7 % — HIGH (ref 34.5–45)
HGB BLD-MCNC: 14.2 G/DL — SIGNIFICANT CHANGE UP (ref 11.5–15.5)
MAGNESIUM SERPL-MCNC: 1.5 MG/DL — LOW (ref 1.8–2.4)
MCHC RBC-ENTMCNC: 28.3 PG — SIGNIFICANT CHANGE UP (ref 27–34)
MCHC RBC-ENTMCNC: 31.1 GM/DL — LOW (ref 32–36)
MCV RBC AUTO: 91 FL — SIGNIFICANT CHANGE UP (ref 80–100)
PHOSPHATE SERPL-MCNC: 2.7 MG/DL — SIGNIFICANT CHANGE UP (ref 2.5–4.5)
PLATELET # BLD AUTO: 383 K/UL — SIGNIFICANT CHANGE UP (ref 150–400)
POTASSIUM SERPL-MCNC: 3.9 MMOL/L — SIGNIFICANT CHANGE UP (ref 3.5–5.3)
POTASSIUM SERPL-SCNC: 3.9 MMOL/L — SIGNIFICANT CHANGE UP (ref 3.5–5.3)
PROT SERPL-MCNC: 6.8 G/DL — SIGNIFICANT CHANGE UP (ref 6–8.3)
RAPID RVP RESULT: SIGNIFICANT CHANGE UP
RBC # BLD: 5.03 M/UL — SIGNIFICANT CHANGE UP (ref 3.8–5.2)
RBC # FLD: 12.9 % — SIGNIFICANT CHANGE UP (ref 10.3–14.5)
SODIUM SERPL-SCNC: 136 MMOL/L — SIGNIFICANT CHANGE UP (ref 135–145)
SPECIMEN SOURCE: SIGNIFICANT CHANGE UP
VIT B12 SERPL-MCNC: 1177 PG/ML — HIGH (ref 243–894)
WBC # BLD: 8.6 K/UL — SIGNIFICANT CHANGE UP (ref 3.8–10.5)
WBC # FLD AUTO: 8.6 K/UL — SIGNIFICANT CHANGE UP (ref 3.8–10.5)

## 2017-01-21 RX ORDER — FUROSEMIDE 40 MG
20 TABLET ORAL DAILY
Qty: 0 | Refills: 0 | Status: DISCONTINUED | OUTPATIENT
Start: 2017-01-21 | End: 2017-01-22

## 2017-01-21 RX ORDER — AMLODIPINE BESYLATE 2.5 MG/1
2.5 TABLET ORAL DAILY
Qty: 0 | Refills: 0 | Status: DISCONTINUED | OUTPATIENT
Start: 2017-01-21 | End: 2017-01-23

## 2017-01-21 RX ORDER — MAGNESIUM SULFATE 500 MG/ML
1 VIAL (ML) INJECTION ONCE
Qty: 0 | Refills: 0 | Status: COMPLETED | OUTPATIENT
Start: 2017-01-21 | End: 2017-01-21

## 2017-01-21 RX ORDER — LOSARTAN POTASSIUM 100 MG/1
25 TABLET, FILM COATED ORAL DAILY
Qty: 0 | Refills: 0 | Status: DISCONTINUED | OUTPATIENT
Start: 2017-01-21 | End: 2017-01-23

## 2017-01-21 RX ORDER — CEFTRIAXONE 500 MG/1
1 INJECTION, POWDER, FOR SOLUTION INTRAMUSCULAR; INTRAVENOUS EVERY 24 HOURS
Qty: 0 | Refills: 0 | Status: DISCONTINUED | OUTPATIENT
Start: 2017-01-21 | End: 2017-01-22

## 2017-01-21 RX ADMIN — PANTOPRAZOLE SODIUM 40 MILLIGRAM(S): 20 TABLET, DELAYED RELEASE ORAL at 07:19

## 2017-01-21 RX ADMIN — CEFTRIAXONE 100 GRAM(S): 500 INJECTION, POWDER, FOR SOLUTION INTRAMUSCULAR; INTRAVENOUS at 22:03

## 2017-01-21 RX ADMIN — CARVEDILOL PHOSPHATE 12.5 MILLIGRAM(S): 80 CAPSULE, EXTENDED RELEASE ORAL at 17:06

## 2017-01-21 RX ADMIN — Medication 100 MILLIGRAM(S): at 17:06

## 2017-01-21 RX ADMIN — CARVEDILOL PHOSPHATE 12.5 MILLIGRAM(S): 80 CAPSULE, EXTENDED RELEASE ORAL at 05:51

## 2017-01-21 RX ADMIN — Medication 20 MILLIGRAM(S): at 11:58

## 2017-01-21 RX ADMIN — ATORVASTATIN CALCIUM 20 MILLIGRAM(S): 80 TABLET, FILM COATED ORAL at 22:02

## 2017-01-21 RX ADMIN — HEPARIN SODIUM 5000 UNIT(S): 5000 INJECTION INTRAVENOUS; SUBCUTANEOUS at 14:40

## 2017-01-21 RX ADMIN — HEPARIN SODIUM 5000 UNIT(S): 5000 INJECTION INTRAVENOUS; SUBCUTANEOUS at 05:51

## 2017-01-21 RX ADMIN — LOSARTAN POTASSIUM 25 MILLIGRAM(S): 100 TABLET, FILM COATED ORAL at 17:06

## 2017-01-21 RX ADMIN — Medication 500 MILLIGRAM(S): at 11:56

## 2017-01-21 RX ADMIN — Medication 20 MILLIGRAM(S): at 05:51

## 2017-01-21 RX ADMIN — HEPARIN SODIUM 5000 UNIT(S): 5000 INJECTION INTRAVENOUS; SUBCUTANEOUS at 22:02

## 2017-01-21 RX ADMIN — Medication 100 GRAM(S): at 11:57

## 2017-01-21 RX ADMIN — Medication 325 MILLIGRAM(S): at 17:06

## 2017-01-21 RX ADMIN — Medication 100 MILLIGRAM(S): at 05:51

## 2017-01-21 RX ADMIN — Medication 325 MILLIGRAM(S): at 11:56

## 2017-01-21 RX ADMIN — Medication 81 MILLIGRAM(S): at 11:57

## 2017-01-22 LAB
% ALBUMIN: 51.8 % — SIGNIFICANT CHANGE UP
% ALPHA 1: 5.3 % — SIGNIFICANT CHANGE UP
% ALPHA 2: 11.8 % — SIGNIFICANT CHANGE UP
% BETA: 13 % — SIGNIFICANT CHANGE UP
% GAMMA: 18.1 % — SIGNIFICANT CHANGE UP
ALBUMIN SERPL ELPH-MCNC: 3.1 G/DL — LOW (ref 3.6–5.5)
ALBUMIN/GLOB SERPL ELPH: 1.1 RATIO — SIGNIFICANT CHANGE UP
ALPHA1 GLOB SERPL ELPH-MCNC: 0.3 G/DL — SIGNIFICANT CHANGE UP (ref 0.1–0.4)
ALPHA2 GLOB SERPL ELPH-MCNC: 0.7 G/DL — SIGNIFICANT CHANGE UP (ref 0.5–1)
B-GLOBULIN SERPL ELPH-MCNC: 0.8 G/DL — SIGNIFICANT CHANGE UP (ref 0.5–1)
GAMMA GLOBULIN: 1.1 G/DL — SIGNIFICANT CHANGE UP (ref 0.6–1.6)
LEGIONELLA AG UR QL: NEGATIVE — SIGNIFICANT CHANGE UP
PROT PATTERN SERPL ELPH-IMP: SIGNIFICANT CHANGE UP

## 2017-01-22 RX ADMIN — ATORVASTATIN CALCIUM 20 MILLIGRAM(S): 80 TABLET, FILM COATED ORAL at 22:16

## 2017-01-22 RX ADMIN — HEPARIN SODIUM 5000 UNIT(S): 5000 INJECTION INTRAVENOUS; SUBCUTANEOUS at 05:31

## 2017-01-22 RX ADMIN — PANTOPRAZOLE SODIUM 40 MILLIGRAM(S): 20 TABLET, DELAYED RELEASE ORAL at 06:02

## 2017-01-22 RX ADMIN — Medication 500 MILLIGRAM(S): at 11:41

## 2017-01-22 RX ADMIN — LOSARTAN POTASSIUM 25 MILLIGRAM(S): 100 TABLET, FILM COATED ORAL at 05:34

## 2017-01-22 RX ADMIN — Medication 325 MILLIGRAM(S): at 17:10

## 2017-01-22 RX ADMIN — Medication 20 MILLIGRAM(S): at 05:31

## 2017-01-22 RX ADMIN — Medication 81 MILLIGRAM(S): at 11:41

## 2017-01-22 RX ADMIN — HEPARIN SODIUM 5000 UNIT(S): 5000 INJECTION INTRAVENOUS; SUBCUTANEOUS at 22:16

## 2017-01-22 RX ADMIN — Medication 100 MILLIGRAM(S): at 05:35

## 2017-01-22 RX ADMIN — Medication 100 MILLIGRAM(S): at 17:10

## 2017-01-22 RX ADMIN — AMLODIPINE BESYLATE 2.5 MILLIGRAM(S): 2.5 TABLET ORAL at 05:31

## 2017-01-22 RX ADMIN — HEPARIN SODIUM 5000 UNIT(S): 5000 INJECTION INTRAVENOUS; SUBCUTANEOUS at 14:01

## 2017-01-22 RX ADMIN — CARVEDILOL PHOSPHATE 12.5 MILLIGRAM(S): 80 CAPSULE, EXTENDED RELEASE ORAL at 17:10

## 2017-01-22 RX ADMIN — Medication 325 MILLIGRAM(S): at 08:38

## 2017-01-23 ENCOUNTER — INPATIENT (INPATIENT)
Facility: HOSPITAL | Age: 82
LOS: 0 days | Discharge: HOME | DRG: 287 | End: 2017-01-24
Attending: INTERNAL MEDICINE | Admitting: INTERNAL MEDICINE
Payer: MEDICARE

## 2017-01-23 VITALS
SYSTOLIC BLOOD PRESSURE: 147 MMHG | RESPIRATION RATE: 20 BRPM | WEIGHT: 142.2 LBS | HEART RATE: 66 BPM | TEMPERATURE: 98 F | OXYGEN SATURATION: 97 % | DIASTOLIC BLOOD PRESSURE: 68 MMHG | HEIGHT: 64 IN

## 2017-01-23 VITALS
SYSTOLIC BLOOD PRESSURE: 145 MMHG | TEMPERATURE: 98 F | HEART RATE: 60 BPM | OXYGEN SATURATION: 94 % | RESPIRATION RATE: 18 BRPM | DIASTOLIC BLOOD PRESSURE: 65 MMHG

## 2017-01-23 DIAGNOSIS — S82.892A OTHER FRACTURE OF LEFT LOWER LEG, INITIAL ENCOUNTER FOR CLOSED FRACTURE: Chronic | ICD-10-CM

## 2017-01-23 DIAGNOSIS — R06.02 SHORTNESS OF BREATH: ICD-10-CM

## 2017-01-23 DIAGNOSIS — S42.302S UNSPECIFIED FRACTURE OF SHAFT OF HUMERUS, LEFT ARM, SEQUELA: Chronic | ICD-10-CM

## 2017-01-23 DIAGNOSIS — H26.9 UNSPECIFIED CATARACT: Chronic | ICD-10-CM

## 2017-01-23 LAB
ALBUMIN SERPL ELPH-MCNC: 2.5 G/DL — LOW (ref 3.5–5)
ALP SERPL-CCNC: 40 U/L — SIGNIFICANT CHANGE UP (ref 40–120)
ALT FLD-CCNC: 16 U/L DA — SIGNIFICANT CHANGE UP (ref 10–60)
ANION GAP SERPL CALC-SCNC: 10 MMOL/L — SIGNIFICANT CHANGE UP (ref 5–17)
AST SERPL-CCNC: 31 U/L — SIGNIFICANT CHANGE UP (ref 10–40)
BILIRUB SERPL-MCNC: 0.7 MG/DL — SIGNIFICANT CHANGE UP (ref 0.2–1.2)
BUN SERPL-MCNC: 21 MG/DL — HIGH (ref 7–18)
CALCIUM SERPL-MCNC: 8.8 MG/DL — SIGNIFICANT CHANGE UP (ref 8.4–10.5)
CHLORIDE SERPL-SCNC: 99 MMOL/L — SIGNIFICANT CHANGE UP (ref 96–108)
CO2 SERPL-SCNC: 27 MMOL/L — SIGNIFICANT CHANGE UP (ref 22–31)
CREAT SERPL-MCNC: 1.24 MG/DL — SIGNIFICANT CHANGE UP (ref 0.5–1.3)
CULTURE RESULTS: NO GROWTH — SIGNIFICANT CHANGE UP
CULTURE RESULTS: SIGNIFICANT CHANGE UP
GLUCOSE SERPL-MCNC: 85 MG/DL — SIGNIFICANT CHANGE UP (ref 70–99)
HCT VFR BLD CALC: 43.6 % — SIGNIFICANT CHANGE UP (ref 34.5–45)
HGB BLD-MCNC: 13.7 G/DL — SIGNIFICANT CHANGE UP (ref 11.5–15.5)
MCHC RBC-ENTMCNC: 28.3 PG — SIGNIFICANT CHANGE UP (ref 27–34)
MCHC RBC-ENTMCNC: 31.4 GM/DL — LOW (ref 32–36)
MCV RBC AUTO: 90.2 FL — SIGNIFICANT CHANGE UP (ref 80–100)
PLATELET # BLD AUTO: 371 K/UL — SIGNIFICANT CHANGE UP (ref 150–400)
POTASSIUM SERPL-MCNC: 3.8 MMOL/L — SIGNIFICANT CHANGE UP (ref 3.5–5.3)
POTASSIUM SERPL-SCNC: 3.8 MMOL/L — SIGNIFICANT CHANGE UP (ref 3.5–5.3)
PROT SERPL-MCNC: 6.5 G/DL — SIGNIFICANT CHANGE UP (ref 6–8.3)
RBC # BLD: 4.84 M/UL — SIGNIFICANT CHANGE UP (ref 3.8–5.2)
RBC # FLD: 12.8 % — SIGNIFICANT CHANGE UP (ref 10.3–14.5)
SODIUM SERPL-SCNC: 136 MMOL/L — SIGNIFICANT CHANGE UP (ref 135–145)
SPECIMEN SOURCE: SIGNIFICANT CHANGE UP
SPECIMEN SOURCE: SIGNIFICANT CHANGE UP
WBC # BLD: 8.3 K/UL — SIGNIFICANT CHANGE UP (ref 3.8–10.5)
WBC # FLD AUTO: 8.3 K/UL — SIGNIFICANT CHANGE UP (ref 3.8–10.5)

## 2017-01-23 PROCEDURE — 87086 URINE CULTURE/COLONY COUNT: CPT

## 2017-01-23 PROCEDURE — 87449 NOS EACH ORGANISM AG IA: CPT

## 2017-01-23 PROCEDURE — 83735 ASSAY OF MAGNESIUM: CPT

## 2017-01-23 PROCEDURE — 87581 M.PNEUMON DNA AMP PROBE: CPT

## 2017-01-23 PROCEDURE — 87400 INFLUENZA A/B EACH AG IA: CPT

## 2017-01-23 PROCEDURE — 82652 VIT D 1 25-DIHYDROXY: CPT

## 2017-01-23 PROCEDURE — 71250 CT THORAX DX C-: CPT

## 2017-01-23 PROCEDURE — 93010 ELECTROCARDIOGRAM REPORT: CPT

## 2017-01-23 PROCEDURE — 83880 ASSAY OF NATRIURETIC PEPTIDE: CPT

## 2017-01-23 PROCEDURE — 73060 X-RAY EXAM OF HUMERUS: CPT

## 2017-01-23 PROCEDURE — 87070 CULTURE OTHR SPECIMN AEROBIC: CPT

## 2017-01-23 PROCEDURE — 82553 CREATINE MB FRACTION: CPT

## 2017-01-23 PROCEDURE — 76775 US EXAM ABDO BACK WALL LIM: CPT

## 2017-01-23 PROCEDURE — 82550 ASSAY OF CK (CPK): CPT

## 2017-01-23 PROCEDURE — 80053 COMPREHEN METABOLIC PANEL: CPT

## 2017-01-23 PROCEDURE — 85027 COMPLETE CBC AUTOMATED: CPT

## 2017-01-23 PROCEDURE — 85045 AUTOMATED RETICULOCYTE COUNT: CPT

## 2017-01-23 PROCEDURE — 83550 IRON BINDING TEST: CPT

## 2017-01-23 PROCEDURE — 87798 DETECT AGENT NOS DNA AMP: CPT

## 2017-01-23 PROCEDURE — 87486 CHLMYD PNEUM DNA AMP PROBE: CPT

## 2017-01-23 PROCEDURE — 71045 X-RAY EXAM CHEST 1 VIEW: CPT

## 2017-01-23 PROCEDURE — 84155 ASSAY OF PROTEIN SERUM: CPT

## 2017-01-23 PROCEDURE — 99285 EMERGENCY DEPT VISIT HI MDM: CPT | Mod: 25

## 2017-01-23 PROCEDURE — 73030 X-RAY EXAM OF SHOULDER: CPT

## 2017-01-23 PROCEDURE — 82607 VITAMIN B-12: CPT

## 2017-01-23 PROCEDURE — 93306 TTE W/DOPPLER COMPLETE: CPT

## 2017-01-23 PROCEDURE — 80061 LIPID PANEL: CPT

## 2017-01-23 PROCEDURE — 84156 ASSAY OF PROTEIN URINE: CPT

## 2017-01-23 PROCEDURE — 84100 ASSAY OF PHOSPHORUS: CPT

## 2017-01-23 PROCEDURE — 97162 PT EVAL MOD COMPLEX 30 MIN: CPT

## 2017-01-23 PROCEDURE — 84484 ASSAY OF TROPONIN QUANT: CPT

## 2017-01-23 PROCEDURE — 82570 ASSAY OF URINE CREATININE: CPT

## 2017-01-23 PROCEDURE — 87040 BLOOD CULTURE FOR BACTERIA: CPT

## 2017-01-23 PROCEDURE — 93005 ELECTROCARDIOGRAM TRACING: CPT

## 2017-01-23 PROCEDURE — 84165 PROTEIN E-PHORESIS SERUM: CPT

## 2017-01-23 PROCEDURE — 87633 RESP VIRUS 12-25 TARGETS: CPT

## 2017-01-23 PROCEDURE — 70450 CT HEAD/BRAIN W/O DYE: CPT

## 2017-01-23 PROCEDURE — 73110 X-RAY EXAM OF WRIST: CPT

## 2017-01-23 PROCEDURE — 81001 URINALYSIS AUTO W/SCOPE: CPT

## 2017-01-23 PROCEDURE — 84443 ASSAY THYROID STIM HORMONE: CPT

## 2017-01-23 PROCEDURE — 82043 UR ALBUMIN QUANTITATIVE: CPT

## 2017-01-23 PROCEDURE — 82248 BILIRUBIN DIRECT: CPT

## 2017-01-23 PROCEDURE — 83036 HEMOGLOBIN GLYCOSYLATED A1C: CPT

## 2017-01-23 PROCEDURE — 82728 ASSAY OF FERRITIN: CPT

## 2017-01-23 RX ORDER — ASPIRIN/CALCIUM CARB/MAGNESIUM 324 MG
1 TABLET ORAL
Qty: 0 | Refills: 0 | COMMUNITY

## 2017-01-23 RX ORDER — FENOFIBRATE,MICRONIZED 130 MG
1 CAPSULE ORAL
Qty: 0 | Refills: 0 | COMMUNITY

## 2017-01-23 RX ORDER — FENOFIBRATE,MICRONIZED 130 MG
48 CAPSULE ORAL DAILY
Qty: 0 | Refills: 0 | Status: DISCONTINUED | OUTPATIENT
Start: 2017-01-23 | End: 2017-01-24

## 2017-01-23 RX ORDER — LOSARTAN POTASSIUM 100 MG/1
1 TABLET, FILM COATED ORAL
Qty: 0 | Refills: 0 | COMMUNITY

## 2017-01-23 RX ORDER — DOCUSATE SODIUM 100 MG
100 CAPSULE ORAL
Qty: 0 | Refills: 0 | Status: DISCONTINUED | OUTPATIENT
Start: 2017-01-23 | End: 2017-01-24

## 2017-01-23 RX ORDER — SIMVASTATIN 20 MG/1
1 TABLET, FILM COATED ORAL
Qty: 0 | Refills: 0 | COMMUNITY

## 2017-01-23 RX ORDER — CARVEDILOL PHOSPHATE 80 MG/1
1 CAPSULE, EXTENDED RELEASE ORAL
Qty: 0 | Refills: 0 | COMMUNITY

## 2017-01-23 RX ORDER — PANTOPRAZOLE SODIUM 20 MG/1
1 TABLET, DELAYED RELEASE ORAL
Qty: 0 | Refills: 0 | COMMUNITY
Start: 2017-01-23

## 2017-01-23 RX ORDER — GLUCAGON INJECTION, SOLUTION 0.5 MG/.1ML
1 INJECTION, SOLUTION SUBCUTANEOUS ONCE
Qty: 0 | Refills: 0 | Status: DISCONTINUED | OUTPATIENT
Start: 2017-01-23 | End: 2017-01-24

## 2017-01-23 RX ORDER — DOCUSATE SODIUM 100 MG
1 CAPSULE ORAL
Qty: 0 | Refills: 0 | COMMUNITY
Start: 2017-01-23

## 2017-01-23 RX ORDER — ASPIRIN/CALCIUM CARB/MAGNESIUM 324 MG
1 TABLET ORAL
Qty: 0 | Refills: 0 | COMMUNITY
Start: 2017-01-23

## 2017-01-23 RX ORDER — DEXTROSE 50 % IN WATER 50 %
1 SYRINGE (ML) INTRAVENOUS ONCE
Qty: 0 | Refills: 0 | Status: DISCONTINUED | OUTPATIENT
Start: 2017-01-23 | End: 2017-01-24

## 2017-01-23 RX ORDER — INSULIN LISPRO 100/ML
VIAL (ML) SUBCUTANEOUS AT BEDTIME
Qty: 0 | Refills: 0 | Status: DISCONTINUED | OUTPATIENT
Start: 2017-01-23 | End: 2017-01-24

## 2017-01-23 RX ORDER — NIFEDIPINE 30 MG
1 TABLET, EXTENDED RELEASE 24 HR ORAL
Qty: 0 | Refills: 0 | COMMUNITY

## 2017-01-23 RX ORDER — LOSARTAN POTASSIUM 100 MG/1
1 TABLET, FILM COATED ORAL
Qty: 0 | Refills: 0 | COMMUNITY
Start: 2017-01-23

## 2017-01-23 RX ORDER — SIMVASTATIN 20 MG/1
40 TABLET, FILM COATED ORAL AT BEDTIME
Qty: 0 | Refills: 0 | Status: DISCONTINUED | OUTPATIENT
Start: 2017-01-23 | End: 2017-01-24

## 2017-01-23 RX ORDER — ASPIRIN/CALCIUM CARB/MAGNESIUM 324 MG
81 TABLET ORAL DAILY
Qty: 0 | Refills: 0 | Status: DISCONTINUED | OUTPATIENT
Start: 2017-01-23 | End: 2017-01-24

## 2017-01-23 RX ORDER — PANTOPRAZOLE SODIUM 20 MG/1
40 TABLET, DELAYED RELEASE ORAL
Qty: 0 | Refills: 0 | Status: DISCONTINUED | OUTPATIENT
Start: 2017-01-23 | End: 2017-01-24

## 2017-01-23 RX ORDER — ASCORBIC ACID 60 MG
500 TABLET,CHEWABLE ORAL DAILY
Qty: 0 | Refills: 0 | Status: DISCONTINUED | OUTPATIENT
Start: 2017-01-23 | End: 2017-01-24

## 2017-01-23 RX ORDER — ACETAMINOPHEN 500 MG
2 TABLET ORAL
Qty: 0 | Refills: 0 | COMMUNITY
Start: 2017-01-23

## 2017-01-23 RX ORDER — CLOPIDOGREL BISULFATE 75 MG/1
75 TABLET, FILM COATED ORAL DAILY
Qty: 0 | Refills: 0 | Status: DISCONTINUED | OUTPATIENT
Start: 2017-01-23 | End: 2017-01-24

## 2017-01-23 RX ORDER — CARVEDILOL PHOSPHATE 80 MG/1
18.75 CAPSULE, EXTENDED RELEASE ORAL EVERY 12 HOURS
Qty: 0 | Refills: 0 | Status: DISCONTINUED | OUTPATIENT
Start: 2017-01-23 | End: 2017-01-24

## 2017-01-23 RX ORDER — AMLODIPINE BESYLATE 2.5 MG/1
1 TABLET ORAL
Qty: 0 | Refills: 0 | COMMUNITY
Start: 2017-01-23

## 2017-01-23 RX ORDER — LOSARTAN POTASSIUM 100 MG/1
100 TABLET, FILM COATED ORAL DAILY
Qty: 0 | Refills: 0 | Status: DISCONTINUED | OUTPATIENT
Start: 2017-01-23 | End: 2017-01-24

## 2017-01-23 RX ORDER — TRAMADOL HYDROCHLORIDE 50 MG/1
1 TABLET ORAL
Qty: 0 | Refills: 0 | COMMUNITY
Start: 2017-01-23

## 2017-01-23 RX ORDER — DEXTROSE 50 % IN WATER 50 %
12.5 SYRINGE (ML) INTRAVENOUS ONCE
Qty: 0 | Refills: 0 | Status: DISCONTINUED | OUTPATIENT
Start: 2017-01-23 | End: 2017-01-24

## 2017-01-23 RX ORDER — INSULIN LISPRO 100/ML
VIAL (ML) SUBCUTANEOUS
Qty: 0 | Refills: 0 | Status: DISCONTINUED | OUTPATIENT
Start: 2017-01-23 | End: 2017-01-24

## 2017-01-23 RX ORDER — NIFEDIPINE 30 MG
30 TABLET, EXTENDED RELEASE 24 HR ORAL DAILY
Qty: 0 | Refills: 0 | Status: DISCONTINUED | OUTPATIENT
Start: 2017-01-23 | End: 2017-01-24

## 2017-01-23 RX ORDER — ASCORBIC ACID 60 MG
1 TABLET,CHEWABLE ORAL
Qty: 0 | Refills: 0 | COMMUNITY
Start: 2017-01-23

## 2017-01-23 RX ORDER — CARVEDILOL PHOSPHATE 80 MG/1
1 CAPSULE, EXTENDED RELEASE ORAL
Qty: 0 | Refills: 0 | COMMUNITY
Start: 2017-01-23

## 2017-01-23 RX ORDER — FERROUS SULFATE 325(65) MG
1 TABLET ORAL
Qty: 60 | Refills: 0 | OUTPATIENT
Start: 2017-01-23 | End: 2017-02-22

## 2017-01-23 RX ORDER — SODIUM CHLORIDE 9 MG/ML
1000 INJECTION, SOLUTION INTRAVENOUS
Qty: 0 | Refills: 0 | Status: DISCONTINUED | OUTPATIENT
Start: 2017-01-23 | End: 2017-01-24

## 2017-01-23 RX ORDER — INSULIN LISPRO 100/ML
0 VIAL (ML) SUBCUTANEOUS
Qty: 0 | Refills: 0 | COMMUNITY
Start: 2017-01-23

## 2017-01-23 RX ORDER — DEXTROSE 50 % IN WATER 50 %
25 SYRINGE (ML) INTRAVENOUS ONCE
Qty: 0 | Refills: 0 | Status: DISCONTINUED | OUTPATIENT
Start: 2017-01-23 | End: 2017-01-24

## 2017-01-23 RX ORDER — BACITRACIN ZINC 500 UNIT/G
1 OINTMENT IN PACKET (EA) TOPICAL
Qty: 0 | Refills: 0 | Status: DISCONTINUED | OUTPATIENT
Start: 2017-01-23 | End: 2017-01-24

## 2017-01-23 RX ORDER — ATORVASTATIN CALCIUM 80 MG/1
20 TABLET, FILM COATED ORAL AT BEDTIME
Qty: 0 | Refills: 0 | Status: DISCONTINUED | OUTPATIENT
Start: 2017-01-23 | End: 2017-01-23

## 2017-01-23 RX ORDER — ATORVASTATIN CALCIUM 80 MG/1
1 TABLET, FILM COATED ORAL
Qty: 0 | Refills: 0 | COMMUNITY
Start: 2017-01-23

## 2017-01-23 RX ADMIN — Medication 500 MILLIGRAM(S): at 17:20

## 2017-01-23 RX ADMIN — Medication 30 MILLIGRAM(S): at 17:19

## 2017-01-23 RX ADMIN — Medication 48 MILLIGRAM(S): at 17:19

## 2017-01-23 RX ADMIN — HEPARIN SODIUM 5000 UNIT(S): 5000 INJECTION INTRAVENOUS; SUBCUTANEOUS at 05:40

## 2017-01-23 RX ADMIN — LOSARTAN POTASSIUM 25 MILLIGRAM(S): 100 TABLET, FILM COATED ORAL at 05:40

## 2017-01-23 RX ADMIN — PANTOPRAZOLE SODIUM 40 MILLIGRAM(S): 20 TABLET, DELAYED RELEASE ORAL at 05:40

## 2017-01-23 RX ADMIN — Medication 325 MILLIGRAM(S): at 08:05

## 2017-01-23 RX ADMIN — AMLODIPINE BESYLATE 2.5 MILLIGRAM(S): 2.5 TABLET ORAL at 05:40

## 2017-01-23 RX ADMIN — CLOPIDOGREL BISULFATE 75 MILLIGRAM(S): 75 TABLET, FILM COATED ORAL at 17:20

## 2017-01-23 RX ADMIN — Medication 100 MILLIGRAM(S): at 17:20

## 2017-01-23 RX ADMIN — Medication 100 MILLIGRAM(S): at 05:40

## 2017-01-23 RX ADMIN — CARVEDILOL PHOSPHATE 12.5 MILLIGRAM(S): 80 CAPSULE, EXTENDED RELEASE ORAL at 05:40

## 2017-01-23 RX ADMIN — Medication 1 APPLICATION(S): at 23:02

## 2017-01-23 RX ADMIN — SIMVASTATIN 40 MILLIGRAM(S): 20 TABLET, FILM COATED ORAL at 20:43

## 2017-01-23 RX ADMIN — CARVEDILOL PHOSPHATE 18.75 MILLIGRAM(S): 80 CAPSULE, EXTENDED RELEASE ORAL at 17:19

## 2017-01-23 RX ADMIN — Medication 81 MILLIGRAM(S): at 17:20

## 2017-01-23 NOTE — H&P CARDIOLOGY - HISTORY OF PRESENT ILLNESS
81yo female Mandarin and Cantonese-speaking from home, lives alone, walks with walker, former smoker, 2 sons at bedside to translate, PMH TIA (8 years ago, no residual deficits, on ASA and plavix), HTN, DM2, s/p left humerus fracture s/p roshan p/w left arm pain x 10 days and dry cough x 1 week. Patient went to urgent care today because left arm pain did not respond to OTC at home.  At urgent care, they noted that patient was hypoxic with 02 sat 85%, and patient BIBA.  As per ED physician who spoke with patient's cardiologist - patient had nuclear stress test last year which showed no reversible ischemia and EF 60s%.  ROS + orthopnea, NICHOLS. Denies fever, chills, changes in vision, nasal congestion, sinus pain, sore throat, chest pain, N/V, diarrhea, abdominal pain, dysuria, rectal bleeding. 81yo female Mandarin and Cantonese-speaking from home, lives alone, walks with walker, former smoker, 2 sons at bedside to translate, PMH TIA (8 years ago, no residual deficits, on ASA and plavix), HTN, DM2, s/p left humerus fracture s/p roshan p/w left arm pain x 10 days and dry cough x 1 week. Patient went to urgent care because left arm pain did not respond to OTC at home.  At urgent care, they noted that patient was hypoxic with 02 sat 85%, and patient was admitted to [facility] .  Patient report she has been experiencing  NICHOLS with walking  and fatigue for at least 1 year. She denies CP, palpitations syncope. She does on occasional feel dizzy when going from sitting to standing. Patient had nuclear stress test last year which showed no reversible ischemia and EF 60s%.  At [facility] hosp Troponin neg x3 , Echo  with EF 55%. Patient was treated with IV lasix. She was started on antibiotics for possible PNA and was ultimately  found to have a UTI, Her creatine was elevated, renal consult called for JOE  and followed patient.  Her current creatine is 1.24 83yo female Mandarin and Cantonese-speaking from home, lives alone, walks with walker, former smoker, 2 sons at bedside to translate, PMH TIA (8 years ago, no residual deficits, on ASA and plavix), HTN, DM2, s/p left humerus fracture s/p roshan p/w left arm pain x 10 days and dry cough x 1 week. Patient went to urgent care because left arm pain did not respond to OTC at home.  At urgent care, they noted that patient was hypoxic with 02 sat 85%, and patient was admitted to [facility] .  Patient report she has been experiencing  NICHOLS with walking  and fatigue for at least 1 year. She denies CP, palpitations syncope. She does on occasional feel dizzy when going from sitting to standing. Patient had nuclear stress test last year which showed no reversible ischemia and EF 60s%.  At [facility] hosp Troponin neg x3 , Echo  with EF 55%.  Her BNP was 13,720.Patient was treated with IV lasix. She was started on antibiotics for possible PNA and was ultimately  found to have a UTI, Her creatine was elevated, renal consult called for OJE  and followed patient.  Her current creatine is 1.24

## 2017-01-24 VITALS
RESPIRATION RATE: 19 BRPM | OXYGEN SATURATION: 99 % | HEART RATE: 57 BPM | TEMPERATURE: 98 F | DIASTOLIC BLOOD PRESSURE: 60 MMHG | SYSTOLIC BLOOD PRESSURE: 152 MMHG

## 2017-01-24 LAB
ANION GAP SERPL CALC-SCNC: 14 MMOL/L — SIGNIFICANT CHANGE UP (ref 5–17)
BUN SERPL-MCNC: 22 MG/DL — SIGNIFICANT CHANGE UP (ref 7–23)
CALCIUM SERPL-MCNC: 9.3 MG/DL — SIGNIFICANT CHANGE UP (ref 8.4–10.5)
CHLORIDE SERPL-SCNC: 97 MMOL/L — SIGNIFICANT CHANGE UP (ref 96–108)
CO2 SERPL-SCNC: 25 MMOL/L — SIGNIFICANT CHANGE UP (ref 22–31)
CREAT SERPL-MCNC: 1.22 MG/DL — SIGNIFICANT CHANGE UP (ref 0.5–1.3)
GLUCOSE SERPL-MCNC: 101 MG/DL — HIGH (ref 70–99)
HCT VFR BLD CALC: 41.8 % — SIGNIFICANT CHANGE UP (ref 34.5–45)
HGB BLD-MCNC: 13.5 G/DL — SIGNIFICANT CHANGE UP (ref 11.5–15.5)
MCHC RBC-ENTMCNC: 29.5 PG — SIGNIFICANT CHANGE UP (ref 27–34)
MCHC RBC-ENTMCNC: 32.4 GM/DL — SIGNIFICANT CHANGE UP (ref 32–36)
MCV RBC AUTO: 91.1 FL — SIGNIFICANT CHANGE UP (ref 80–100)
PLATELET # BLD AUTO: 396 K/UL — SIGNIFICANT CHANGE UP (ref 150–400)
POTASSIUM SERPL-MCNC: 4.2 MMOL/L — SIGNIFICANT CHANGE UP (ref 3.5–5.3)
POTASSIUM SERPL-SCNC: 4.2 MMOL/L — SIGNIFICANT CHANGE UP (ref 3.5–5.3)
RBC # BLD: 4.58 M/UL — SIGNIFICANT CHANGE UP (ref 3.8–5.2)
RBC # FLD: 12.6 % — SIGNIFICANT CHANGE UP (ref 10.3–14.5)
SODIUM SERPL-SCNC: 136 MMOL/L — SIGNIFICANT CHANGE UP (ref 135–145)
WBC # BLD: 10.6 K/UL — HIGH (ref 3.8–10.5)
WBC # FLD AUTO: 10.6 K/UL — HIGH (ref 3.8–10.5)

## 2017-01-24 PROCEDURE — 93460 R&L HRT ART/VENTRICLE ANGIO: CPT

## 2017-01-24 PROCEDURE — 80048 BASIC METABOLIC PNL TOTAL CA: CPT

## 2017-01-24 PROCEDURE — 93005 ELECTROCARDIOGRAM TRACING: CPT

## 2017-01-24 PROCEDURE — 97161 PT EVAL LOW COMPLEX 20 MIN: CPT

## 2017-01-24 PROCEDURE — C1769: CPT

## 2017-01-24 PROCEDURE — C1894: CPT

## 2017-01-24 PROCEDURE — 94664 DEMO&/EVAL PT USE INHALER: CPT

## 2017-01-24 PROCEDURE — 85027 COMPLETE CBC AUTOMATED: CPT

## 2017-01-24 PROCEDURE — C1887: CPT

## 2017-01-24 PROCEDURE — 93010 ELECTROCARDIOGRAM REPORT: CPT

## 2017-01-24 RX ORDER — ASCORBIC ACID 60 MG
1 TABLET,CHEWABLE ORAL
Qty: 0 | Refills: 0 | COMMUNITY
Start: 2017-01-24

## 2017-01-24 RX ORDER — CLOPIDOGREL BISULFATE 75 MG/1
1 TABLET, FILM COATED ORAL
Qty: 30 | Refills: 0 | OUTPATIENT
Start: 2017-01-24 | End: 2017-02-23

## 2017-01-24 RX ORDER — CLOPIDOGREL BISULFATE 75 MG/1
1 TABLET, FILM COATED ORAL
Qty: 0 | Refills: 0 | COMMUNITY

## 2017-01-24 RX ADMIN — PANTOPRAZOLE SODIUM 40 MILLIGRAM(S): 20 TABLET, DELAYED RELEASE ORAL at 06:08

## 2017-01-24 RX ADMIN — CLOPIDOGREL BISULFATE 75 MILLIGRAM(S): 75 TABLET, FILM COATED ORAL at 06:08

## 2017-01-24 RX ADMIN — CARVEDILOL PHOSPHATE 18.75 MILLIGRAM(S): 80 CAPSULE, EXTENDED RELEASE ORAL at 06:08

## 2017-01-24 RX ADMIN — Medication 100 MILLIGRAM(S): at 06:08

## 2017-01-24 RX ADMIN — Medication 30 MILLIGRAM(S): at 06:08

## 2017-01-24 RX ADMIN — Medication 81 MILLIGRAM(S): at 06:08

## 2017-01-24 RX ADMIN — Medication 1: at 11:32

## 2017-01-24 RX ADMIN — LOSARTAN POTASSIUM 100 MILLIGRAM(S): 100 TABLET, FILM COATED ORAL at 06:08

## 2017-01-24 RX ADMIN — Medication 500 MILLIGRAM(S): at 10:12

## 2017-01-24 RX ADMIN — Medication 1 APPLICATION(S): at 10:17

## 2017-01-24 NOTE — DISCHARGE NOTE ADULT - MEDICATION SUMMARY - MEDICATIONS TO STOP TAKING
I will STOP taking the medications listed below when I get home from the hospital:    acetaminophen 325 mg oral tablet  -- 2 tab(s) by mouth every 6 hours, As needed, Mild Pain (1 - 3), hosp    insulin lispro 100 units/mL subcutaneous solution  --  subcutaneous 4 times a day (before meals and at bedtime); 1 Unit(s) if Glucose 151 - 200  2 Unit(s) if Glucose 201 - 250  3 Unit(s) if Glucose 251 - 300  4 Unit(s) if Glucose 301 - 350  5 Unit(s) if Glucose 351 - 400  6 Unit(s) if Glucose GREATER THAN 400, hospital    ferrous sulfate 325 mg (65 mg elemental iron) oral tablet  -- 1 tab(s) by mouth 2 times a day, hosp    pantoprazole 40 mg oral delayed release tablet  -- 1 tab(s) by mouth once a day (before a meal), hosp

## 2017-01-24 NOTE — DISCHARGE NOTE ADULT - MEDICATION SUMMARY - MEDICATIONS TO TAKE
I will START or STAY ON the medications listed below when I get home from the hospital:    aspirin 81 mg oral tablet, chewable  -- 1 tab(s) by mouth once a day, home & hosp  -- Indication: For CAD    losartan 100 mg oral tablet  -- 1 tab(s) by mouth once a day  -- Indication: For HTN    Janumet 50 mg-500 mg oral tablet  -- 1 tab(s) by mouth 2 times a day, home  -- Indication: For DM    fenofibrate 67 mg oral capsule  -- 1 cap(s) by mouth once a day, home  -- Indication: For HLD    simvastatin 40 mg oral tablet  -- 1 tab(s) by mouth once a day (at bedtime)  -- Indication: For HLD    carvedilol 12.5 mg oral tablet  -- 1.5 tab(s) by mouth 2 times a day, home  -- Indication: For CHF    NIFEdipine 30 mg oral tablet, extended release  -- 1 tab(s) by mouth once a day  -- Indication: For HTN    ascorbic acid 500 mg oral tablet  -- 1 tab(s) by mouth once a day  -- Indication: For Supplements I will START or STAY ON the medications listed below when I get home from the hospital:    aspirin 81 mg oral tablet, chewable  -- 1 tab(s) by mouth once a day, home & hosp  -- Indication: For CAD    losartan 100 mg oral tablet  -- 1 tab(s) by mouth once a day  -- Indication: For HTN    Janumet 50 mg-500 mg oral tablet  -- 1 tab(s) by mouth 2 times a day,  restart on 1/25/17  -- Indication: For DM (diabetes mellitus)    simvastatin 40 mg oral tablet  -- 1 tab(s) by mouth once a day (at bedtime)  -- Indication: For HLD    fenofibrate 67 mg oral capsule  -- 1 cap(s) by mouth once a day, home  -- Indication: For HLD    clopidogrel 75 mg oral tablet  -- 1 tab(s) by mouth once a day MDD:one  -- Indication: For CAD    carvedilol 12.5 mg oral tablet  -- 1.5 tab(s) by mouth 2 times a day, home  -- Indication: For CHF    NIFEdipine 30 mg oral tablet, extended release  -- 1 tab(s) by mouth once a day  -- Indication: For HTN    ascorbic acid 500 mg oral tablet  -- 1 tab(s) by mouth once a day  -- Indication: For Supplements

## 2017-01-24 NOTE — DISCHARGE NOTE ADULT - FINDINGS/TREATMENT
There is mild irregularity of the coronary anatomy.  DIAGNOSTIC RECOMMENDATIONS: Medical management is recommended.

## 2017-01-24 NOTE — PHYSICAL THERAPY INITIAL EVALUATION ADULT - ADDITIONAL COMMENTS
Pt lives alone in an apartment w/ no steps to negotiate. Utilizes a rolling walker for outdoor excursions. Pt and son reports assist will be available at home upon d/c.

## 2017-01-24 NOTE — DISCHARGE NOTE ADULT - CARE PLAN
Principal Discharge DX:	Chest pain  Goal:	To relieve chest pain  Instructions for follow-up, activity and diet:	Low salt, low fat diet.   Weight management.   Take medications as prescribed.    No smoking.  Follow up appointments with your doctor(s)  as instruced.  Secondary Diagnosis:	DM (diabetes mellitus)  Goal:	To improve the blood glucose level  Instructions for follow-up, activity and diet:	Continue to follow with your primary care MD or your endocrinologist.  Follow a heart healthy diabetic diet. If you check your fingerstick glucose at home, call your MD if it is greater than 250mg/dL on 2 occasions or less than 100mg/dL on 2 occasions. Know signs of low blood sugar, such as: dizziness, shakiness, sweating, confusion, hunger, nervousness-drink 4 ounces apple juice if occurs and call your doctor. Know early signs of high blood sugar, such as: frequent urination, increased thirst, blurry vision, fatigue, headache - call your doctor if this occurs. Follow with other practitioners to care for your diabetes, such as ophthalmologist and podiatrist.  Secondary Diagnosis:	HLD (hyperlipidemia)  Goal:	LDL<70  Instructions for follow-up, activity and diet:	Goal is to keep LDL<70. Continue with your cholesterol medications as prescribed. Eat a heart healthy diet that is low in saturated fats and salt, and includes whole grains, fruits, vegetables and lean protein; exercise regularly (consult with your physician or cardiologist first); maintain a heart healthy weight; if you smoke - quit (A resource to help you stop smoking is the [facility] [place] for [employer] Control – phone number [phone].). Continue to follow with your primary physician or cardiologist.  Secondary Diagnosis:	HTN (hypertension)  Goal:	Your blood pressure will be controlled.  Instructions for follow-up, activity and diet:	Continue with your blood pressure medications; eat a heart healthy diet with low salt diet; exercise regularly (consult with your physician or cardiologist first); maintain a heart healthy weight; if you smoke - quit (A resource to help you stop smoking is the [facility] [employer] Control – phone number [phone].); include healthy ways to manage stress. Continue to follow with your primary care physician or cardiologist. Principal Discharge DX:	Chest pain  Goal:	To relieve chest pain  Instructions for follow-up, activity and diet:	Low salt, low fat diet.   Weight management.   Take medications as prescribed.    No smoking.  Follow up appointments with your doctor(s)  as instruced.  Secondary Diagnosis:	DM (diabetes mellitus)  Goal:	To improve the blood glucose level  Instructions for follow-up, activity and diet:	Continue to follow with your primary care MD or your endocrinologist.  Follow a heart healthy diabetic diet. If you check your fingerstick glucose at home, call your MD if it is greater than 250mg/dL on 2 occasions or less than 100mg/dL on 2 occasions. Know signs of low blood sugar, such as: dizziness, shakiness, sweating, confusion, hunger, nervousness-drink 4 ounces apple juice if occurs and call your doctor. Know early signs of high blood sugar, such as: frequent urination, increased thirst, blurry vision, fatigue, headache - call your doctor if this occurs. Follow with other practitioners to care for your diabetes, such as ophthalmologist and podiatrist.  Secondary Diagnosis:	HLD (hyperlipidemia)  Goal:	LDL<70  Instructions for follow-up, activity and diet:	Goal is to keep LDL<70. Continue with your cholesterol medications as prescribed. Eat a heart healthy diet that is low in saturated fats and salt, and includes whole grains, fruits, vegetables and lean protein; exercise regularly (consult with your physician or cardiologist first); maintain a heart healthy weight; if you smoke - quit (A resource to help you stop smoking is the [facility] [employer] for [employer] Control – phone number [phone].). Continue to follow with your primary physician or cardiologist.  Secondary Diagnosis:	HTN (hypertension)  Goal:	Your blood pressure will be controlled.  Instructions for follow-up, activity and diet:	Continue with your blood pressure medications; eat a heart healthy diet with low salt diet; exercise regularly (consult with your physician or cardiologist first); maintain a heart healthy weight; if you smoke - quit (A resource to help you stop smoking is the [facility] [employer] Control – phone number [phone].); include healthy ways to manage stress. Continue to follow with your primary care physician or cardiologist. Principal Discharge DX:	Chest pain  Goal:	To relieve chest pain  Instructions for follow-up, activity and diet:	Low salt, low fat diet.   Weight management.   Take medications as prescribed.    No smoking.  Follow up appointments with your doctor(s)  as instruced.  Secondary Diagnosis:	DM (diabetes mellitus)  Goal:	To improve the blood glucose level  Instructions for follow-up, activity and diet:	Continue to follow with your primary care MD or your endocrinologist.  Follow a heart healthy diabetic diet. If you check your fingerstick glucose at home, call your MD if it is greater than 250mg/dL on 2 occasions or less than 100mg/dL on 2 occasions. Know signs of low blood sugar, such as: dizziness, shakiness, sweating, confusion, hunger, nervousness-drink 4 ounces apple juice if occurs and call your doctor. Know early signs of high blood sugar, such as: frequent urination, increased thirst, blurry vision, fatigue, headache - call your doctor if this occurs. Follow with other practitioners to care for your diabetes, such as ophthalmologist and podiatrist.  Secondary Diagnosis:	HLD (hyperlipidemia)  Goal:	LDL<70  Instructions for follow-up, activity and diet:	Goal is to keep LDL<70. Continue with your cholesterol medications as prescribed. Eat a heart healthy diet that is low in saturated fats and salt, and includes whole grains, fruits, vegetables and lean protein; exercise regularly (consult with your physician or cardiologist first); maintain a heart healthy weight; if you smoke - quit (A resource to help you stop smoking is the [facility] for [employer] Control – phone number [phone].). Continue to follow with your primary physician or cardiologist.  Secondary Diagnosis:	HTN (hypertension)  Goal:	Your blood pressure will be controlled.  Instructions for follow-up, activity and diet:	Continue with your blood pressure medications; eat a heart healthy diet with low salt diet; exercise regularly (consult with your physician or cardiologist first); maintain a heart healthy weight; if you smoke - quit (A resource to help you stop smoking is the [facility] [employer] Control – phone number [phone].); include healthy ways to manage stress. Continue to follow with your primary care physician or cardiologist. Principal Discharge DX:	Chest pain  Goal:	To relieve chest pain  Instructions for follow-up, activity and diet:	Low salt, low fat diet.   Weight management.   Take medications as prescribed.    No smoking.  Follow up appointments with your doctor(s)  as instruced.  Secondary Diagnosis:	DM (diabetes mellitus)  Goal:	To improve the blood glucose level  Instructions for follow-up, activity and diet:	Continue to follow with your primary care MD or your endocrinologist.  Follow a heart healthy diabetic diet. If you check your fingerstick glucose at home, call your MD if it is greater than 250mg/dL on 2 occasions or less than 100mg/dL on 2 occasions. Know signs of low blood sugar, such as: dizziness, shakiness, sweating, confusion, hunger, nervousness-drink 4 ounces apple juice if occurs and call your doctor. Know early signs of high blood sugar, such as: frequent urination, increased thirst, blurry vision, fatigue, headache - call your doctor if this occurs. Follow with other practitioners to care for your diabetes, such as ophthalmologist and podiatrist.  Secondary Diagnosis:	HLD (hyperlipidemia)  Goal:	LDL<70  Instructions for follow-up, activity and diet:	Goal is to keep LDL<70. Continue with your cholesterol medications as prescribed. Eat a heart healthy diet that is low in saturated fats and salt, and includes whole grains, fruits, vegetables and lean protein; exercise regularly (consult with your physician or cardiologist first); maintain a heart healthy weight; if you smoke - quit (A resource to help you stop smoking is the [facility] [employer] for [employer] Control – phone number [phone].). Continue to follow with your primary physician or cardiologist.  Secondary Diagnosis:	HTN (hypertension)  Goal:	Your blood pressure will be controlled.  Instructions for follow-up, activity and diet:	Continue with your blood pressure medications; eat a heart healthy diet with low salt diet; exercise regularly (consult with your physician or cardiologist first); maintain a heart healthy weight; if you smoke - quit (A resource to help you stop smoking is the [facility] [employer] Control – phone number [phone].); include healthy ways to manage stress. Continue to follow with your primary care physician or cardiologist.

## 2017-01-24 NOTE — PHYSICAL THERAPY INITIAL EVALUATION ADULT - PERTINENT HX OF CURRENT PROBLEM, REHAB EVAL
81yo female primary speaking Mandarin and Cantonese (understands and speaks English). PMH TIA (8 years ago, no residual deficits), HTN, DM2, s/p left humerus fracture w/ roshan placement. Pt admit to [facility] left arm pain x 10 days and dry cough x 1 week. Pt sent from urgent care when noted patient w/ 02 sat 85%. Patient reports NICHOLS w/ walking x 1 yr. P found to have a UTI. Pt t/f to [facility] for card cath w/o intervention. Pt w/ dx of pulm HTN.

## 2017-01-24 NOTE — DISCHARGE NOTE ADULT - PLAN OF CARE
To relieve chest pain Low salt, low fat diet.   Weight management.   Take medications as prescribed.    No smoking.  Follow up appointments with your doctor(s)  as instruced. To improve the blood glucose level Continue to follow with your primary care MD or your endocrinologist.  Follow a heart healthy diabetic diet. If you check your fingerstick glucose at home, call your MD if it is greater than 250mg/dL on 2 occasions or less than 100mg/dL on 2 occasions. Know signs of low blood sugar, such as: dizziness, shakiness, sweating, confusion, hunger, nervousness-drink 4 ounces apple juice if occurs and call your doctor. Know early signs of high blood sugar, such as: frequent urination, increased thirst, blurry vision, fatigue, headache - call your doctor if this occurs. Follow with other practitioners to care for your diabetes, such as ophthalmologist and podiatrist. LDL<70 Goal is to keep LDL<70. Continue with your cholesterol medications as prescribed. Eat a heart healthy diet that is low in saturated fats and salt, and includes whole grains, fruits, vegetables and lean protein; exercise regularly (consult with your physician or cardiologist first); maintain a heart healthy weight; if you smoke - quit (A resource to help you stop smoking is the [facility] for [employer] Control – phone number [phone].). Continue to follow with your primary physician or cardiologist. Your blood pressure will be controlled. Continue with your blood pressure medications; eat a heart healthy diet with low salt diet; exercise regularly (consult with your physician or cardiologist first); maintain a heart healthy weight; if you smoke - quit (A resource to help you stop smoking is the [facility] for Tobacco Control – phone number [phone].); include healthy ways to manage stress. Continue to follow with your primary care physician or cardiologist.

## 2017-01-24 NOTE — DISCHARGE NOTE ADULT - ADDITIONAL INSTRUCTIONS
No heavy lifting, strenuous activity, bending, straining or unneccessary stair climbing  for 2 weeks. No sex for 1 week.  No driving for 2 days. You may shower 24 hours following procedure but avoid baths and swimming for 1 week. Check groin site for bleeding and/or swelling daily following procedure. Call your doctor/cardiologist immediately should it occur or if you have increased/persistent pain at the site. Follow up with your cardiologist in 1- 2 weeks. You may call [facility] at [phone] or [phone] after office hours and weekends  with any questions or concerns following your procedure. Take medications as prescribed.

## 2017-01-24 NOTE — DISCHARGE NOTE ADULT - CARE PROVIDER_API CALL
Sonja Griffin), Cardiovascular Disease; Internal Medicine  [id number] [place] [address]  Phone: [phone]  Fax: [phone]

## 2017-01-24 NOTE — DISCHARGE NOTE ADULT - HOSPITAL COURSE
83yo female Mandarin and Cantonese-speaking from home, lives alone, walks with walker, former smoker, 2 sons at bedside to translate, PMH TIA (8 years ago, no residual deficits, on ASA and plavix), HTN, DM2, s/p left humerus fracture s/p roshan p/w left arm pain x 10 days and dry cough x 1 week. Patient went to urgent care because left arm pain did not respond to OTC at home.  At urgent care, they noted that patient was hypoxic with 02 sat 85%, and patient was admitted to [facility] .  Patient report she has been experiencing  NICHOLS with walking  and fatigue for at least 1 year. She denies CP, palpitations syncope. She does on occasional feel dizzy when going from sitting to standing. Patient had nuclear stress test last year which showed no reversible ischemia and EF 60s%.  At [facility] hosp Troponin neg x3 , Echo  with EF 55%.  Her BNP was 13,720.Patient was treated with IV lasix. She was started on antibiotics for possible PNA and was ultimately  found to have a UTI, Her creatine was elevated, renal consult called for JOE  and followed patient.  Her current creatine is 1.24 83yo female Mandarin and Cantonese-speaking from home, lives alone, walks with walker, former smoker, 2 sons at bedside to translate, PMH TIA (8 years ago, no residual deficits, on ASA and plavix), HTN, DM2, s/p left humerus fracture s/p roshan p/w left arm pain x 10 days and dry cough x 1 week. Patient went to urgent care because left arm pain did not respond to OTC at home.  At urgent care, they noted that patient was hypoxic with 02 sat 85%, and patient was admitted to [facility] .  Patient report she has been experiencing  NICHOLS with walking  and fatigue for at least 1 year. She denies CP, palpitations syncope. She does on occasional feel dizzy when going from sitting to standing. Patient had nuclear stress test last year which showed no reversible ischemia and EF 60s%.  At [facility] hosp Troponin neg x3 , Echo  with EF 55%.  Her BNP was 13,720.Patient was treated with IV lasix. She was started on antibiotics for possible PNA and was ultimately  found to have a UTI, Her creatine was elevated, renal consult called for JOE  and followed patient.  Her current creatine is 1.24, Patient had diagnostic cath via right groin. PT evaluation wwas done and patient is going home with home PT. Patient desaturated while ambulating and is sending home with home oxygen.

## 2017-01-24 NOTE — DISCHARGE NOTE ADULT - HOME CARE AGENCY
[facility] @ [phone] (VN, P/T,HHA); START OF CARE DAY AFTER D/C.

## 2017-01-24 NOTE — DISCHARGE NOTE ADULT - PATIENT PORTAL LINK FT
“You can access the FollowHealth Patient Portal, offered by [facility], by registering with the following website: [email or web address]”

## 2017-01-26 DIAGNOSIS — E11.21 TYPE 2 DIABETES MELLITUS WITH DIABETIC NEPHROPATHY: ICD-10-CM

## 2017-01-26 DIAGNOSIS — I50.33 ACUTE ON CHRONIC DIASTOLIC (CONGESTIVE) HEART FAILURE: ICD-10-CM

## 2017-01-26 DIAGNOSIS — M79.602 PAIN IN LEFT ARM: ICD-10-CM

## 2017-01-26 DIAGNOSIS — R74.0 NONSPECIFIC ELEVATION OF LEVELS OF TRANSAMINASE AND LACTIC ACID DEHYDROGENASE [LDH]: ICD-10-CM

## 2017-01-26 DIAGNOSIS — S09.90XA UNSPECIFIED INJURY OF HEAD, INITIAL ENCOUNTER: ICD-10-CM

## 2017-01-26 DIAGNOSIS — Z86.73 PERSONAL HISTORY OF TRANSIENT ISCHEMIC ATTACK (TIA), AND CEREBRAL INFARCTION WITHOUT RESIDUAL DEFICITS: ICD-10-CM

## 2017-01-26 DIAGNOSIS — J18.9 PNEUMONIA, UNSPECIFIED ORGANISM: ICD-10-CM
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
## 2017-01-26 DIAGNOSIS — Z79.84 LONG TERM (CURRENT) USE OF ORAL HYPOGLYCEMIC DRUGS: ICD-10-CM

## 2017-01-26 DIAGNOSIS — I10 ESSENTIAL (PRIMARY) HYPERTENSION: ICD-10-CM

## 2017-01-26 DIAGNOSIS — Z79.82 LONG TERM (CURRENT) USE OF ASPIRIN: ICD-10-CM

## 2017-01-26 DIAGNOSIS — Z87.891 PERSONAL HISTORY OF NICOTINE DEPENDENCE: ICD-10-CM

## 2017-01-26 DIAGNOSIS — I27.2 OTHER SECONDARY PULMONARY HYPERTENSION: ICD-10-CM

## 2017-01-26 DIAGNOSIS — R06.09 OTHER FORMS OF DYSPNEA: ICD-10-CM

## 2017-01-26 LAB
CULTURE RESULTS: SIGNIFICANT CHANGE UP
CULTURE RESULTS: SIGNIFICANT CHANGE UP
SPECIMEN SOURCE: SIGNIFICANT CHANGE UP
SPECIMEN SOURCE: SIGNIFICANT CHANGE UP

## 2017-02-08 PROBLEM — I10 ESSENTIAL (PRIMARY) HYPERTENSION: Chronic | Status: ACTIVE | Noted: 2017-01-18

## 2017-02-08 PROBLEM — E11.9 TYPE 2 DIABETES MELLITUS WITHOUT COMPLICATIONS: Chronic | Status: ACTIVE | Noted: 2017-01-18

## 2017-02-08 PROBLEM — E78.5 HYPERLIPIDEMIA, UNSPECIFIED: Chronic | Status: ACTIVE | Noted: 2017-01-18

## 2018-01-26 ENCOUNTER — INPATIENT (INPATIENT)
Facility: HOSPITAL | Age: 83
LOS: 3 days | Discharge: NURSING, REHAB OR LONG-TERM CARE | DRG: 187 | End: 2018-01-30
Attending: SURGERY | Admitting: SURGERY
Payer: MEDICARE

## 2018-01-26 VITALS
DIASTOLIC BLOOD PRESSURE: 60 MMHG | OXYGEN SATURATION: 99 % | RESPIRATION RATE: 20 BRPM | SYSTOLIC BLOOD PRESSURE: 119 MMHG | HEART RATE: 84 BPM

## 2018-01-26 DIAGNOSIS — S42.302S UNSPECIFIED FRACTURE OF SHAFT OF HUMERUS, LEFT ARM, SEQUELA: Chronic | ICD-10-CM

## 2018-01-26 DIAGNOSIS — H26.9 UNSPECIFIED CATARACT: Chronic | ICD-10-CM

## 2018-01-26 DIAGNOSIS — S82.892A OTHER FRACTURE OF LEFT LOWER LEG, INITIAL ENCOUNTER FOR CLOSED FRACTURE: Chronic | ICD-10-CM

## 2018-01-26 LAB
ALBUMIN SERPL ELPH-MCNC: 3.5 G/DL — SIGNIFICANT CHANGE UP (ref 3.3–5)
ALP SERPL-CCNC: 45 U/L — SIGNIFICANT CHANGE UP (ref 40–120)
ALT FLD-CCNC: 20 U/L RC — SIGNIFICANT CHANGE UP (ref 10–45)
ANION GAP SERPL CALC-SCNC: 11 MMOL/L — SIGNIFICANT CHANGE UP (ref 5–17)
APTT BLD: 31 SEC — SIGNIFICANT CHANGE UP (ref 27.5–37.4)
AST SERPL-CCNC: 53 U/L — HIGH (ref 10–40)
BASOPHILS # BLD AUTO: 0 K/UL — SIGNIFICANT CHANGE UP (ref 0–0.2)
BILIRUB SERPL-MCNC: 0.6 MG/DL — SIGNIFICANT CHANGE UP (ref 0.2–1.2)
BUN SERPL-MCNC: 22 MG/DL — SIGNIFICANT CHANGE UP (ref 7–23)
CALCIUM SERPL-MCNC: 9.5 MG/DL — SIGNIFICANT CHANGE UP (ref 8.4–10.5)
CHLORIDE SERPL-SCNC: 98 MMOL/L — SIGNIFICANT CHANGE UP (ref 96–108)
CO2 SERPL-SCNC: 24 MMOL/L — SIGNIFICANT CHANGE UP (ref 22–31)
CREAT SERPL-MCNC: 1.22 MG/DL — SIGNIFICANT CHANGE UP (ref 0.5–1.3)
EOSINOPHIL # BLD AUTO: 0.5 K/UL — SIGNIFICANT CHANGE UP (ref 0–0.5)
EOSINOPHIL NFR BLD AUTO: 1 % — SIGNIFICANT CHANGE UP (ref 0–6)
GLUCOSE SERPL-MCNC: 153 MG/DL — HIGH (ref 70–99)
HCT VFR BLD CALC: 39.1 % — SIGNIFICANT CHANGE UP (ref 34.5–45)
HGB BLD-MCNC: 13.6 G/DL — SIGNIFICANT CHANGE UP (ref 11.5–15.5)
INR BLD: 0.96 RATIO — SIGNIFICANT CHANGE UP (ref 0.88–1.16)
LYMPHOCYTES # BLD AUTO: 1 K/UL — SIGNIFICANT CHANGE UP (ref 1–3.3)
LYMPHOCYTES # BLD AUTO: 6 % — LOW (ref 13–44)
MCHC RBC-ENTMCNC: 32.8 PG — SIGNIFICANT CHANGE UP (ref 27–34)
MCHC RBC-ENTMCNC: 34.7 GM/DL — SIGNIFICANT CHANGE UP (ref 32–36)
MCV RBC AUTO: 94.5 FL — SIGNIFICANT CHANGE UP (ref 80–100)
MONOCYTES # BLD AUTO: 1 K/UL — HIGH (ref 0–0.9)
MONOCYTES NFR BLD AUTO: 5 % — SIGNIFICANT CHANGE UP (ref 2–14)
NEUTROPHILS # BLD AUTO: 21 K/UL — HIGH (ref 1.8–7.4)
NEUTROPHILS NFR BLD AUTO: 83 % — HIGH (ref 43–77)
PLATELET # BLD AUTO: 331 K/UL — SIGNIFICANT CHANGE UP (ref 150–400)
POTASSIUM SERPL-MCNC: 5.1 MMOL/L — SIGNIFICANT CHANGE UP (ref 3.5–5.3)
POTASSIUM SERPL-SCNC: 5.1 MMOL/L — SIGNIFICANT CHANGE UP (ref 3.5–5.3)
PROT SERPL-MCNC: 7.1 G/DL — SIGNIFICANT CHANGE UP (ref 6–8.3)
PROTHROM AB SERPL-ACNC: 10.4 SEC — SIGNIFICANT CHANGE UP (ref 9.8–12.7)
RBC # BLD: 4.14 M/UL — SIGNIFICANT CHANGE UP (ref 3.8–5.2)
RBC # FLD: 12.9 % — SIGNIFICANT CHANGE UP (ref 10.3–14.5)
SODIUM SERPL-SCNC: 133 MMOL/L — LOW (ref 135–145)
WBC # BLD: 23.6 K/UL — HIGH (ref 3.8–10.5)
WBC # FLD AUTO: 23.6 K/UL — HIGH (ref 3.8–10.5)

## 2018-01-26 PROCEDURE — 99285 EMERGENCY DEPT VISIT HI MDM: CPT | Mod: 25

## 2018-01-26 PROCEDURE — 31500 INSERT EMERGENCY AIRWAY: CPT

## 2018-01-26 PROCEDURE — 93010 ELECTROCARDIOGRAM REPORT: CPT | Mod: 59

## 2018-01-26 PROCEDURE — 74177 CT ABD & PELVIS W/CONTRAST: CPT | Mod: 26

## 2018-01-26 PROCEDURE — 71260 CT THORAX DX C+: CPT | Mod: 26

## 2018-01-26 RX ORDER — ACETAMINOPHEN 500 MG
1000 TABLET ORAL ONCE
Qty: 0 | Refills: 0 | Status: COMPLETED | OUTPATIENT
Start: 2018-01-26 | End: 2018-01-26

## 2018-01-26 RX ORDER — MORPHINE SULFATE 50 MG/1
2 CAPSULE, EXTENDED RELEASE ORAL ONCE
Qty: 0 | Refills: 0 | Status: DISCONTINUED | OUTPATIENT
Start: 2018-01-26 | End: 2018-01-26

## 2018-01-26 RX ADMIN — Medication 400 MILLIGRAM(S): at 21:49

## 2018-01-26 RX ADMIN — MORPHINE SULFATE 2 MILLIGRAM(S): 50 CAPSULE, EXTENDED RELEASE ORAL at 22:19

## 2018-01-26 NOTE — ED PROVIDER NOTE - ATTENDING CONTRIBUTION TO CARE
83 yof pmhx tia on asa/plavix years ago, old displaced left humerus fx which son  happened years ago, presents with left flank/ chest pain after fall.  tripped in the bathroom and landed with left chest on edge of bathtub.  was unable to get up off the ground for approx 45 min until found by son. denies hitting head, no loc. c/o severe 10/10 pain.     ROS:   constitutional - no fever, no chills  eyes - no visual changes, no redness  eent - no sore throat, no nasal congestion  cvs - + chest wall pain, no leg swelling  resp - + shortness of breath, no cough  gi - no abdominal pain, no vomiting, no diarrhea  gu - no dysuria, no hematuria  msk - no acute back pain, no joint swelling  skin - no rashes, no jaundice  neuro - no headache, no focal weakness  psych - no acute mental health issue     Physical Exam:   constitutional - ill appearing, awake and alert, oriented x3, offered  - pt states ok using son. + mod distress due to pain  head - no external evidence of trauma  cvs - rrr, no murmurs, no peripheral edema  resp -breath sounds present bilat. signif tenderness w overlying ecchymosis to left lateral chest wall/ upper abd wall  gi - abdomen soft and nontender, no rigidity, guarding or rebound, bowel sounds present  msk - moving all extremities spontaneously  neuro - alert and oriented x3, no focal deficits, CNs 2-12 grossly intact  skin- no jaundice, warm and dry  psych - mood and affect wnl, no apparent risk to self or others     pt w significant tenderness to left chest/upper abd wall concerning for rib fx and/or renal lac/ intraabd trauma. ct c/a/p done showing left 20% hemopneumo w assoc mult displaced overlying rib fx and likely element of pulm contusion. surg/ trauma eval called. chest tube placed in ED initially in soft tissue, adjusted with help of surg resident in ED. will admit to [facility] for ongoing management. DAVID Topete MD

## 2018-01-26 NOTE — ED PROVIDER NOTE - PMH
JOE (acute kidney injury)    Congestive heart failure, unspecified congestive heart failure chronicity, unspecified congestive heart failure type    DM (diabetes mellitus)    HLD (hyperlipidemia)    HTN (hypertension)    Pulmonary fibrosis  on 2L Home O2  TIA (transient ischemic attack)  on ASA/Plavix

## 2018-01-26 NOTE — ED PROVIDER NOTE - OBJECTIVE STATEMENT
83yo female Mandarin and Cantonese-speaking from home, lives alone, walks with walker, former smoker, 2 sons at bedside to translate, PMH TIA (8 years ago, no residual deficits, on ASA and plavix) p/w left flank/back pain s/p fall from her feet on to bath tub hitting her side. 83yo female Mandarin and Cantonese-speaking from home, lives alone, walks with walker, former smoker, son is at bedside to translate, PMH TIA (8 years ago, no residual deficits, on ASA and plavix) p/w left flank/back pain s/p fall from her feet on to bath tub hitting her side. Pt was on the ground for roughly 45 minutes, was unable to get to her feet due to the pain in her back. Pain is reported to be 10/10, constant. Reports she may have been dizzy before the fall but can not quite remember. No head injury or LOC. No pain in any other area other than the left flank and left lumbar region. Pt denies cp, sob, headache, neck pain, flu like sx, recent travel or illness.

## 2018-01-27 DIAGNOSIS — S22.39XA FRACTURE OF ONE RIB, UNSPECIFIED SIDE, INITIAL ENCOUNTER FOR CLOSED FRACTURE: ICD-10-CM

## 2018-01-27 PROBLEM — N17.9 ACUTE KIDNEY FAILURE, UNSPECIFIED: Chronic | Status: ACTIVE | Noted: 2017-01-23

## 2018-01-27 PROBLEM — G45.9 TRANSIENT CEREBRAL ISCHEMIC ATTACK, UNSPECIFIED: Chronic | Status: ACTIVE | Noted: 2017-01-18

## 2018-01-27 LAB
ANION GAP SERPL CALC-SCNC: 10 MMOL/L — SIGNIFICANT CHANGE UP (ref 5–17)
APPEARANCE UR: ABNORMAL
APTT BLD: 29.5 SEC — SIGNIFICANT CHANGE UP (ref 27.5–37.4)
BACTERIA # UR AUTO: ABNORMAL /HPF
BILIRUB UR-MCNC: NEGATIVE — SIGNIFICANT CHANGE UP
BUN SERPL-MCNC: 25 MG/DL — HIGH (ref 7–23)
CALCIUM SERPL-MCNC: 9.1 MG/DL — SIGNIFICANT CHANGE UP (ref 8.4–10.5)
CHLORIDE SERPL-SCNC: 99 MMOL/L — SIGNIFICANT CHANGE UP (ref 96–108)
CO2 SERPL-SCNC: 25 MMOL/L — SIGNIFICANT CHANGE UP (ref 22–31)
COLOR SPEC: YELLOW — SIGNIFICANT CHANGE UP
CREAT SERPL-MCNC: 1.44 MG/DL — HIGH (ref 0.5–1.3)
DIFF PNL FLD: NEGATIVE — SIGNIFICANT CHANGE UP
EPI CELLS # UR: SIGNIFICANT CHANGE UP /HPF
GLUCOSE BLDC GLUCOMTR-MCNC: 141 MG/DL — HIGH (ref 70–99)
GLUCOSE BLDC GLUCOMTR-MCNC: 149 MG/DL — HIGH (ref 70–99)
GLUCOSE BLDC GLUCOMTR-MCNC: 150 MG/DL — HIGH (ref 70–99)
GLUCOSE BLDC GLUCOMTR-MCNC: 188 MG/DL — HIGH (ref 70–99)
GLUCOSE SERPL-MCNC: 176 MG/DL — HIGH (ref 70–99)
GLUCOSE UR QL: NEGATIVE — SIGNIFICANT CHANGE UP
HCT VFR BLD CALC: 35.5 % — SIGNIFICANT CHANGE UP (ref 34.5–45)
HGB BLD-MCNC: 12.2 G/DL — SIGNIFICANT CHANGE UP (ref 11.5–15.5)
INR BLD: 1.04 RATIO — SIGNIFICANT CHANGE UP (ref 0.88–1.16)
KETONES UR-MCNC: NEGATIVE — SIGNIFICANT CHANGE UP
LEUKOCYTE ESTERASE UR-ACNC: ABNORMAL
MAGNESIUM SERPL-MCNC: 1.8 MG/DL — SIGNIFICANT CHANGE UP (ref 1.6–2.6)
MCHC RBC-ENTMCNC: 32.7 PG — SIGNIFICANT CHANGE UP (ref 27–34)
MCHC RBC-ENTMCNC: 34.5 GM/DL — SIGNIFICANT CHANGE UP (ref 32–36)
MCV RBC AUTO: 94.8 FL — SIGNIFICANT CHANGE UP (ref 80–100)
NITRITE UR-MCNC: NEGATIVE — SIGNIFICANT CHANGE UP
PH UR: 6 — SIGNIFICANT CHANGE UP (ref 5–8)
PHOSPHATE SERPL-MCNC: 4.3 MG/DL — SIGNIFICANT CHANGE UP (ref 2.5–4.5)
PLATELET # BLD AUTO: 338 K/UL — SIGNIFICANT CHANGE UP (ref 150–400)
POTASSIUM SERPL-MCNC: 4.6 MMOL/L — SIGNIFICANT CHANGE UP (ref 3.5–5.3)
POTASSIUM SERPL-SCNC: 4.6 MMOL/L — SIGNIFICANT CHANGE UP (ref 3.5–5.3)
PROT UR-MCNC: 150 MG/DL
PROTHROM AB SERPL-ACNC: 11.4 SEC — SIGNIFICANT CHANGE UP (ref 9.8–12.7)
RBC # BLD: 3.74 M/UL — LOW (ref 3.8–5.2)
RBC # FLD: 13 % — SIGNIFICANT CHANGE UP (ref 10.3–14.5)
SODIUM SERPL-SCNC: 134 MMOL/L — LOW (ref 135–145)
SP GR SPEC: >1.03 — HIGH (ref 1.01–1.02)
UROBILINOGEN FLD QL: NEGATIVE — SIGNIFICANT CHANGE UP
WBC # BLD: 18.9 K/UL — HIGH (ref 3.8–10.5)
WBC # FLD AUTO: 18.9 K/UL — HIGH (ref 3.8–10.5)
WBC UR QL: >50 /HPF (ref 0–5)

## 2018-01-27 PROCEDURE — 71045 X-RAY EXAM CHEST 1 VIEW: CPT | Mod: 26

## 2018-01-27 PROCEDURE — 99291 CRITICAL CARE FIRST HOUR: CPT | Mod: 25

## 2018-01-27 PROCEDURE — 73090 X-RAY EXAM OF FOREARM: CPT | Mod: 26,LT

## 2018-01-27 PROCEDURE — 73060 X-RAY EXAM OF HUMERUS: CPT | Mod: 26,LT

## 2018-01-27 PROCEDURE — 73070 X-RAY EXAM OF ELBOW: CPT | Mod: 26,LT

## 2018-01-27 PROCEDURE — 70450 CT HEAD/BRAIN W/O DYE: CPT | Mod: 26

## 2018-01-27 PROCEDURE — 73110 X-RAY EXAM OF WRIST: CPT | Mod: 26,LT

## 2018-01-27 PROCEDURE — 73030 X-RAY EXAM OF SHOULDER: CPT | Mod: 26,LT

## 2018-01-27 PROCEDURE — 72125 CT NECK SPINE W/O DYE: CPT | Mod: 26

## 2018-01-27 RX ORDER — CARVEDILOL PHOSPHATE 80 MG/1
1.5 CAPSULE, EXTENDED RELEASE ORAL
Qty: 0 | Refills: 0 | COMMUNITY

## 2018-01-27 RX ORDER — ENOXAPARIN SODIUM 100 MG/ML
40 INJECTION SUBCUTANEOUS DAILY
Qty: 0 | Refills: 0 | Status: DISCONTINUED | OUTPATIENT
Start: 2018-01-27 | End: 2018-01-30

## 2018-01-27 RX ORDER — IPRATROPIUM/ALBUTEROL SULFATE 18-103MCG
3 AEROSOL WITH ADAPTER (GRAM) INHALATION EVERY 6 HOURS
Qty: 0 | Refills: 0 | Status: DISCONTINUED | OUTPATIENT
Start: 2018-01-27 | End: 2018-01-30

## 2018-01-27 RX ORDER — MAGNESIUM SULFATE 500 MG/ML
2 VIAL (ML) INJECTION ONCE
Qty: 0 | Refills: 0 | Status: COMPLETED | OUTPATIENT
Start: 2018-01-27 | End: 2018-01-27

## 2018-01-27 RX ORDER — SIMVASTATIN 20 MG/1
40 TABLET, FILM COATED ORAL AT BEDTIME
Qty: 0 | Refills: 0 | Status: DISCONTINUED | OUTPATIENT
Start: 2018-01-27 | End: 2018-01-30

## 2018-01-27 RX ORDER — CARVEDILOL PHOSPHATE 80 MG/1
12.5 CAPSULE, EXTENDED RELEASE ORAL EVERY 12 HOURS
Qty: 0 | Refills: 0 | Status: DISCONTINUED | OUTPATIENT
Start: 2018-01-27 | End: 2018-01-29

## 2018-01-27 RX ORDER — SODIUM CHLORIDE 9 MG/ML
1000 INJECTION INTRAMUSCULAR; INTRAVENOUS; SUBCUTANEOUS
Qty: 0 | Refills: 0 | Status: DISCONTINUED | OUTPATIENT
Start: 2018-01-27 | End: 2018-01-28

## 2018-01-27 RX ORDER — NIFEDIPINE 30 MG
1 TABLET, EXTENDED RELEASE 24 HR ORAL
Qty: 0 | Refills: 0 | COMMUNITY

## 2018-01-27 RX ORDER — ACETAMINOPHEN 500 MG
650 TABLET ORAL EVERY 6 HOURS
Qty: 0 | Refills: 0 | Status: COMPLETED | OUTPATIENT
Start: 2018-01-27 | End: 2018-01-29

## 2018-01-27 RX ORDER — SODIUM CHLORIDE 9 MG/ML
1000 INJECTION, SOLUTION INTRAVENOUS
Qty: 0 | Refills: 0 | Status: DISCONTINUED | OUTPATIENT
Start: 2018-01-27 | End: 2018-01-27

## 2018-01-27 RX ORDER — OXYCODONE HYDROCHLORIDE 5 MG/1
5 TABLET ORAL EVERY 4 HOURS
Qty: 0 | Refills: 0 | Status: DISCONTINUED | OUTPATIENT
Start: 2018-01-27 | End: 2018-01-30

## 2018-01-27 RX ORDER — IBUPROFEN 200 MG
400 TABLET ORAL EVERY 6 HOURS
Qty: 0 | Refills: 0 | Status: DISCONTINUED | OUTPATIENT
Start: 2018-01-27 | End: 2018-01-27

## 2018-01-27 RX ORDER — INSULIN LISPRO 100/ML
VIAL (ML) SUBCUTANEOUS AT BEDTIME
Qty: 0 | Refills: 0 | Status: DISCONTINUED | OUTPATIENT
Start: 2018-01-27 | End: 2018-01-30

## 2018-01-27 RX ORDER — OXYCODONE HYDROCHLORIDE 5 MG/1
10 TABLET ORAL EVERY 4 HOURS
Qty: 0 | Refills: 0 | Status: DISCONTINUED | OUTPATIENT
Start: 2018-01-27 | End: 2018-01-30

## 2018-01-27 RX ORDER — MORPHINE SULFATE 50 MG/1
2 CAPSULE, EXTENDED RELEASE ORAL ONCE
Qty: 0 | Refills: 0 | Status: DISCONTINUED | OUTPATIENT
Start: 2018-01-27 | End: 2018-01-27

## 2018-01-27 RX ORDER — HYDROMORPHONE HYDROCHLORIDE 2 MG/ML
0.5 INJECTION INTRAMUSCULAR; INTRAVENOUS; SUBCUTANEOUS ONCE
Qty: 0 | Refills: 0 | Status: DISCONTINUED | OUTPATIENT
Start: 2018-01-27 | End: 2018-01-27

## 2018-01-27 RX ORDER — INSULIN LISPRO 100/ML
VIAL (ML) SUBCUTANEOUS
Qty: 0 | Refills: 0 | Status: DISCONTINUED | OUTPATIENT
Start: 2018-01-27 | End: 2018-01-30

## 2018-01-27 RX ADMIN — Medication 2: at 18:17

## 2018-01-27 RX ADMIN — Medication 3 MILLILITER(S): at 12:24

## 2018-01-27 RX ADMIN — OXYCODONE HYDROCHLORIDE 10 MILLIGRAM(S): 5 TABLET ORAL at 15:29

## 2018-01-27 RX ADMIN — Medication 3 MILLILITER(S): at 06:08

## 2018-01-27 RX ADMIN — Medication 650 MILLIGRAM(S): at 12:37

## 2018-01-27 RX ADMIN — ENOXAPARIN SODIUM 40 MILLIGRAM(S): 100 INJECTION SUBCUTANEOUS at 15:19

## 2018-01-27 RX ADMIN — Medication 400 MILLIGRAM(S): at 06:00

## 2018-01-27 RX ADMIN — MORPHINE SULFATE 2 MILLIGRAM(S): 50 CAPSULE, EXTENDED RELEASE ORAL at 02:28

## 2018-01-27 RX ADMIN — Medication 1000 MILLIGRAM(S): at 02:28

## 2018-01-27 RX ADMIN — Medication 650 MILLIGRAM(S): at 06:00

## 2018-01-27 RX ADMIN — OXYCODONE HYDROCHLORIDE 10 MILLIGRAM(S): 5 TABLET ORAL at 20:01

## 2018-01-27 RX ADMIN — CARVEDILOL PHOSPHATE 12.5 MILLIGRAM(S): 80 CAPSULE, EXTENDED RELEASE ORAL at 17:59

## 2018-01-27 RX ADMIN — HYDROMORPHONE HYDROCHLORIDE 0.5 MILLIGRAM(S): 2 INJECTION INTRAMUSCULAR; INTRAVENOUS; SUBCUTANEOUS at 12:36

## 2018-01-27 RX ADMIN — HYDROMORPHONE HYDROCHLORIDE 0.5 MILLIGRAM(S): 2 INJECTION INTRAMUSCULAR; INTRAVENOUS; SUBCUTANEOUS at 12:53

## 2018-01-27 RX ADMIN — Medication 3 MILLILITER(S): at 23:42

## 2018-01-27 RX ADMIN — Medication 650 MILLIGRAM(S): at 12:50

## 2018-01-27 RX ADMIN — MORPHINE SULFATE 2 MILLIGRAM(S): 50 CAPSULE, EXTENDED RELEASE ORAL at 02:29

## 2018-01-27 RX ADMIN — OXYCODONE HYDROCHLORIDE 5 MILLIGRAM(S): 5 TABLET ORAL at 09:43

## 2018-01-27 RX ADMIN — OXYCODONE HYDROCHLORIDE 10 MILLIGRAM(S): 5 TABLET ORAL at 15:45

## 2018-01-27 RX ADMIN — SODIUM CHLORIDE 50 MILLILITER(S): 9 INJECTION INTRAMUSCULAR; INTRAVENOUS; SUBCUTANEOUS at 12:37

## 2018-01-27 RX ADMIN — OXYCODONE HYDROCHLORIDE 5 MILLIGRAM(S): 5 TABLET ORAL at 10:00

## 2018-01-27 RX ADMIN — Medication 3 MILLILITER(S): at 17:54

## 2018-01-27 RX ADMIN — Medication 650 MILLIGRAM(S): at 18:17

## 2018-01-27 RX ADMIN — Medication 0: at 12:51

## 2018-01-27 RX ADMIN — Medication 650 MILLIGRAM(S): at 06:55

## 2018-01-27 RX ADMIN — Medication 50 GRAM(S): at 09:50

## 2018-01-27 RX ADMIN — Medication 0: at 09:25

## 2018-01-27 RX ADMIN — CARVEDILOL PHOSPHATE 12.5 MILLIGRAM(S): 80 CAPSULE, EXTENDED RELEASE ORAL at 06:56

## 2018-01-27 RX ADMIN — Medication 400 MILLIGRAM(S): at 06:55

## 2018-01-27 RX ADMIN — Medication 650 MILLIGRAM(S): at 17:59

## 2018-01-27 RX ADMIN — SODIUM CHLORIDE 50 MILLILITER(S): 9 INJECTION INTRAMUSCULAR; INTRAVENOUS; SUBCUTANEOUS at 09:50

## 2018-01-27 RX ADMIN — OXYCODONE HYDROCHLORIDE 10 MILLIGRAM(S): 5 TABLET ORAL at 21:00

## 2018-01-27 NOTE — CONSULT NOTE ADULT - SUBJECTIVE AND OBJECTIVE BOX
HISTORY OF PRESENT ILLNESS:  SARAH TEMPLE is a 83y Female primarily Mandarin speaking female with PMH of DM, HTN, HLD, TIA (on ASA, Plavix) brought in s/p fall. Pt's son was at bedside in ED, as per report he said the pt was in her bathroom and her legs gave way and she fell on her left side on the bathtub. Denies hitting her head or LOC.  Was unable to get up by herself due to pain, had to wait for family to come and help her.       PAST MEDICAL HISTORY: JOE (acute kidney injury)  TIA (transient ischemic attack)  HLD (hyperlipidemia)  HTN (hypertension)  DM (diabetes mellitus)      PAST SURGICAL HISTORY: Cataract, acquired  Fracture dislocation of left ankle joint  Fracture of left upper extremity, sequela  No significant past surgical history      FAMILY HISTORY: No pertinent family history in first degree relatives      SOCIAL HISTORY:     CODE STATUS: Full code     HOME MEDICATIONS: Patient Currently Takes Medications as of 27-Jan-2018 00:35  · 	clopidogrel 75 mg oral tablet: 1 tab(s) orally once a day MDD:one  · 	aspirin 81 mg oral tablet, chewable: 1 tab(s) orally once a day, home & hosp  · 	simvastatin 40 mg oral tablet: 1 tab(s) orally once a day (at bedtime)  · 	losartan 100 mg oral tablet: 1 tab(s) orally once a day   · 	fenofibrate 67 mg oral capsule: 1 cap(s) orally once a day, home  · 	carvedilol 12.5 mg oral tablet: 1 tab(s) orally 2 times a day   · 	NIFEdipine 30 mg oral tablet, extended release: 1 tab(s) orally 2 times a day  · 	Esbriet 801 mg oral tablet: 1 tab(s) orally 3 times a day   · 	Janumet 50 mg-500 mg oral tablet: 1 tab(s) orally 2 times a week  · 	Incruse Ellipta 62.5 mcg/inh inhalation powder: inhaled once a day      ALLERGIES: No Known Allergies      VITAL SIGNS:  ICU Vital Signs Last 24 Hrs  T(C): 36.4 (27 Jan 2018 02:31), Max: 36.6 (26 Jan 2018 22:17)  T(F): 97.5 (27 Jan 2018 02:31), Max: 97.9 (26 Jan 2018 22:17)  HR: 76 (27 Jan 2018 02:31) (76 - 87)  BP: 132/75 (27 Jan 2018 02:31) (119/60 - 186/89)    RR: 18 (27 Jan 2018 02:31) (18 - 20)  SpO2: 100% (27 Jan 2018 02:31) (90% - 100%)      NEURO  Exam: sleeping comfortably  Meds: x    RESPIRATORY  Exam: clear to auscultation bilaterally, left chest tube in place draining sanguinous   Meds: x    CARDIOVASCULAR  Exam: regular rate and rhythm  Cardiac Rhythm: snius  Meds: x    GI/NUTRITION  Exam: soft, nontender, nondistended  Diet: NPO  Meds: x    GENITOURINARY/RENAL  Meds: x      01-26    133<L>  |  98  |  22  ----------------------------<  153<H>  5.1   |  24  |  1.22    Ca    9.5      26 Jan 2018 21:49    TPro  7.1  /  Alb  3.5  /  TBili  0.6  /  DBili  x   /  AST  53<H>  /  ALT  20  /  AlkPhos  45  01-26    [ ] Rowe catheter, indication: urine output monitoring in critically ill patient    HEMATOLOGIC  [ ] VTE Prophylaxis:                          13.6   23.6  )-----------( 331      ( 26 Jan 2018 21:49 )             39.1     PT/INR - ( 26 Jan 2018 21:50 )   PT: 10.4 sec;   INR: 0.96 ratio         PTT - ( 26 Jan 2018 21:50 )  PTT:31.0 sec  Transfusion: [ ] PRBC	[ ] Platelets	[ ] FFP	[ ] Cryoprecipitate      INFECTIOUS DISEASES  Meds:  RECENT CULTURES:      ENDOCRINE  Meds:  CAPILLARY BLOOD GLUCOSE          PATIENT CARE ACCESS DEVICES:  [ ] Peripheral IV  [ ] Central Venous Line	[ ] R	[ ] L	[ ] IJ	[ ] Fem	[ ] SC	Placed:   [ ] Arterial Line		[ ] R	[ ] L	[ ] Fem	[ ] Rad	[ ] Ax	Placed:   [ ] PICC:					[ ] Mediport  [ ] Urinary Catheter, Date Placed:   [x] Necessity of urinary, arterial, and venous catheters discussed    OTHER MEDICATIONS:     IMAGING STUDIES: HISTORY OF PRESENT ILLNESS:  SARAH TEMPLE is a 83y Female primarily Mandarin speaking female with PMH of DM, HTN, HLD, TIA (on ASA, Plavix), CHF, pulmonary fibrosis who was brought in s/p fall. Pt's son at bedside and translating, as per son pt lives alone at home and ambulates without assistance inside her home, uses a Rollator walker when she goes out. She was in her bathroom and her legs felt weak and gave way, and she fell on her left side on the bathtub. Denies hitting her head or LOC.  Was unable to get up by herself due to pain. Someone was visiting and was unable to get inside the house, at that point they knew something was wrong and called the ambulance.     PAST MEDICAL HISTORY: JOE (acute kidney injury)  TIA (transient ischemic attack)  HLD (hyperlipidemia)  HTN (hypertension)  DM (diabetes mellitus)      PAST SURGICAL HISTORY: Cataract, acquired  Fracture dislocation of left ankle joint  Fracture of left upper extremity, sequela  No significant past surgical history      FAMILY HISTORY: No pertinent family history in first degree relatives      SOCIAL HISTORY:     CODE STATUS: Full code     HOME MEDICATIONS: Patient Currently Takes Medications as of 27-Jan-2018 00:35  · 	clopidogrel 75 mg oral tablet: 1 tab(s) orally once a day MDD:one  · 	aspirin 81 mg oral tablet, chewable: 1 tab(s) orally once a day, home & hosp  · 	simvastatin 40 mg oral tablet: 1 tab(s) orally once a day (at bedtime)  · 	losartan 100 mg oral tablet: 1 tab(s) orally once a day   · 	fenofibrate 67 mg oral capsule: 1 cap(s) orally once a day, home  · 	carvedilol 12.5 mg oral tablet: 1 tab(s) orally 2 times a day   · 	NIFEdipine 30 mg oral tablet, extended release: 1 tab(s) orally 2 times a day  · 	Esbriet 801 mg oral tablet: 1 tab(s) orally 3 times a day   · 	Janumet 50 mg-500 mg oral tablet: 1 tab(s) orally 2 times a week  · 	Incruse Ellipta 62.5 mcg/inh inhalation powder: inhaled once a day      ALLERGIES: No Known Allergies      VITAL SIGNS:  ICU Vital Signs Last 24 Hrs  T(C): 36.4 (27 Jan 2018 02:31), Max: 36.6 (26 Jan 2018 22:17)  T(F): 97.5 (27 Jan 2018 02:31), Max: 97.9 (26 Jan 2018 22:17)  HR: 76 (27 Jan 2018 02:31) (76 - 87)  BP: 132/75 (27 Jan 2018 02:31) (119/60 - 186/89)    RR: 18 (27 Jan 2018 02:31) (18 - 20)  SpO2: 100% (27 Jan 2018 02:31) (90% - 100%)      NEURO  Exam: sleeping comfortably  Meds: x    RESPIRATORY  Exam: clear to auscultation bilaterally, left chest tube in place draining sanguinous   Meds: x    CARDIOVASCULAR  Exam: regular rate and rhythm  Cardiac Rhythm: snius  Meds: x    GI/NUTRITION  Exam: soft, nontender, nondistended  Diet: NPO  Meds: x    GENITOURINARY/RENAL  Meds: x      01-26    133<L>  |  98  |  22  ----------------------------<  153<H>  5.1   |  24  |  1.22    Ca    9.5      26 Jan 2018 21:49    TPro  7.1  /  Alb  3.5  /  TBili  0.6  /  DBili  x   /  AST  53<H>  /  ALT  20  /  AlkPhos  45  01-26    [ ] Rowe catheter, indication: urine output monitoring in critically ill patient    HEMATOLOGIC  [ ] VTE Prophylaxis:                          13.6   23.6  )-----------( 331      ( 26 Jan 2018 21:49 )             39.1     PT/INR - ( 26 Jan 2018 21:50 )   PT: 10.4 sec;   INR: 0.96 ratio         PTT - ( 26 Jan 2018 21:50 )  PTT:31.0 sec  Transfusion: [ ] PRBC	[ ] Platelets	[ ] FFP	[ ] Cryoprecipitate      INFECTIOUS DISEASES  Meds:  RECENT CULTURES:      ENDOCRINE  Meds:  CAPILLARY BLOOD GLUCOSE          PATIENT CARE ACCESS DEVICES:  [ ] Peripheral IV  [ ] Central Venous Line	[ ] R	[ ] L	[ ] IJ	[ ] Fem	[ ] SC	Placed:   [ ] Arterial Line		[ ] R	[ ] L	[ ] Fem	[ ] Rad	[ ] Ax	Placed:   [ ] PICC:					[ ] Mediport  [ ] Urinary Catheter, Date Placed:   [x] Necessity of urinary, arterial, and venous catheters discussed    OTHER MEDICATIONS:     IMAGING STUDIES: HISTORY OF PRESENT ILLNESS:  SARAH TEMPLE is a 83y Female primarily Mandarin speaking female with PMH of DM, HTN, HLD, TIA (on ASA, Plavix), CHF, pulmonary fibrosis on 2L home O2 who was brought in s/p fall. Pt's son at bedside and translating, as per son pt lives alone at home and ambulates without assistance inside her home, uses a Rollator walker when she goes out. She was in her bathroom and her legs felt weak and gave way, and she fell on her left side on the bathtub. Denies hitting her head or LOC.  Was unable to get up by herself due to pain. Someone was visiting and was unable to get inside the house, at that point they knew something was wrong and called the ambulance.     PAST MEDICAL HISTORY: JOE (acute kidney injury)  TIA (transient ischemic attack)  HLD (hyperlipidemia)  HTN (hypertension)  DM (diabetes mellitus)  CHF   pulmonary fibrosis on home O2 2L    PAST SURGICAL HISTORY: Cataract, acquired  Fracture dislocation of left ankle joint  Fracture of left upper extremity, sequela  No significant past surgical history      FAMILY HISTORY: No pertinent family history in first degree relatives    SOCIAL HISTORY: quit smoking >10 years ago, drinks alcohol on special occasions 1-2 times per month    CODE STATUS: Full code     HOME MEDICATIONS: Patient Currently Takes Medications as of 27-Jan-2018 00:35  · 	clopidogrel 75 mg oral tablet: 1 tab(s) orally once a day MDD:one  · 	aspirin 81 mg oral tablet, chewable: 1 tab(s) orally once a day, home & hosp  · 	simvastatin 40 mg oral tablet: 1 tab(s) orally once a day (at bedtime)  · 	losartan 100 mg oral tablet: 1 tab(s) orally once a day   · 	fenofibrate 67 mg oral capsule: 1 cap(s) orally once a day, home  · 	carvedilol 12.5 mg oral tablet: 1 tab(s) orally 2 times a day   · 	NIFEdipine 30 mg oral tablet, extended release: 1 tab(s) orally 2 times a day  · 	Esbriet 801 mg oral tablet: 1 tab(s) orally 3 times a day   · 	Janumet 50 mg-500 mg oral tablet: 1 tab(s) orally 2 times a week  · 	Incruse Ellipta 62.5 mcg/inh inhalation powder: inhaled once a day      ALLERGIES: No Known Allergies      VITAL SIGNS:  ICU Vital Signs Last 24 Hrs  T(C): 36.4 (27 Jan 2018 02:31), Max: 36.6 (26 Jan 2018 22:17)  T(F): 97.5 (27 Jan 2018 02:31), Max: 97.9 (26 Jan 2018 22:17)  HR: 76 (27 Jan 2018 02:31) (76 - 87)  BP: 132/75 (27 Jan 2018 02:31) (119/60 - 186/89)    RR: 18 (27 Jan 2018 02:31) (18 - 20)  SpO2: 100% (27 Jan 2018 02:31) (90% - 100%)      NEURO  Exam: sleeping comfortably, arousable, oriented x3  Meds:  acetaminophen   Tablet. 650 milliGRAM(s) Oral every 6 hours  ibuprofen  Tablet 400 milliGRAM(s) Oral every 6 hours  oxyCODONE    IR 5 milliGRAM(s) Oral every 4 hours PRN Moderate Pain (4 - 6)      RESPIRATORY  Exam: clear to auscultation bilaterally, left chest tube in place draining sanguinous fluid  Meds: ALBUTerol/ipratropium for Nebulization 3 milliLiter(s) Nebulizer every 6 hours      CARDIOVASCULAR  Exam: regular rate and rhythm  Cardiac Rhythm: sinus  Meds: x    GI/NUTRITION  Exam: soft, nontender, nondistended  Diet: Consistent carb regular diet  Meds: x    GENITOURINARY/RENAL  Meds: sodium chloride 0.9%. 1000 milliLiter(s) (50 mL/Hr) IV Continuous <Continuous>      01-26    133<L>  |  98  |  22  ----------------------------<  153<H>  5.1   |  24  |  1.22    Ca    9.5      26 Jan 2018 21:49    TPro  7.1  /  Alb  3.5  /  TBili  0.6  /  DBili  x   /  AST  53<H>  /  ALT  20  /  AlkPhos  45  01-26    [n/a ] Rowe catheter, indication: urine output monitoring in critically ill patient    HEMATOLOGIC  [x ] VTE Prophylaxis: enoxaparin Injectable 40 milliGRAM(s) SubCutaneous daily                          13.6   23.6  )-----------( 331      ( 26 Jan 2018 21:49 )             39.1     PT/INR - ( 26 Jan 2018 21:50 )   PT: 10.4 sec;   INR: 0.96 ratio         PTT - ( 26 Jan 2018 21:50 )  PTT:31.0 sec  Transfusion: [ ] PRBC	[ ] Platelets	[ ] FFP	[ ] Cryoprecipitate      INFECTIOUS DISEASES  Meds: x  RECENT CULTURES: x      ENDOCRINE  Meds: insulin lispro (HumaLOG) corrective regimen sliding scale   SubCutaneous three times a day before meals  insulin lispro (HumaLOG) corrective regimen sliding scale   SubCutaneous at bedtime  CAPILLARY BLOOD GLUCOSE          PATIENT CARE ACCESS DEVICES:  [x ] Peripheral IV  [ ] Central Venous Line	[ ] R	[ ] L	[ ] IJ	[ ] Fem	[ ] SC	Placed:   [ ] Arterial Line		[ ] R	[ ] L	[ ] Fem	[ ] Rad	[ ] Ax	Placed:   [ ] PICC:					[ ] Mediport  [ ] Urinary Catheter, Date Placed:   [x] Necessity of urinary, arterial, and venous catheters discussed    OTHER MEDICATIONS:  x    IMAGING STUDIES: < from: CT Chest w/ IV Cont (01.26.18 @ 22:45) >  IMPRESSION: Displaced and overriding left lateral rib fractures. Left   hemopneumothorax involving approximately 20% of the pleural space.    Emphysema. Bilateral groundglass opacities are nonspecific and may be due   to edema or pulmonary contusion.

## 2018-01-27 NOTE — H&P ADULT - ASSESSMENT
83F s/p fall with left 5-9th rib fractures and left hemopneumothorax  -Admit to [facility] under Dr. Beltran  -SICU consulted for respiratory monitoring  -Chest tube placed by ED  -pain control  -incentive spirometry  -CT to suction, monitor output  -d/w Dr. Beltran

## 2018-01-27 NOTE — H&P ADULT - PMH
JOE (acute kidney injury)    DM (diabetes mellitus)    HLD (hyperlipidemia)    HTN (hypertension)    TIA (transient ischemic attack) JOE (acute kidney injury)    Congestive heart failure, unspecified congestive heart failure chronicity, unspecified congestive heart failure type    DM (diabetes mellitus)    HLD (hyperlipidemia)    HTN (hypertension)    Pulmonary fibrosis  on 2L Home O2  TIA (transient ischemic attack) JOE (acute kidney injury)    Congestive heart failure, unspecified congestive heart failure chronicity, unspecified congestive heart failure type    DM (diabetes mellitus)    HLD (hyperlipidemia)    HTN (hypertension)    Pulmonary fibrosis  on 2L Home O2  TIA (transient ischemic attack)  on ASA/Plavix

## 2018-01-27 NOTE — CONSULT NOTE ADULT - ASSESSMENT
ASSESSMENT: 83yFemale     PLAN:   Neurologic:    Respiratory:    Cardiovascular:    Gastrointestinal/Nutrition:    Genitourinary/Renal:    Hematologic:    Infectious Disease:    Endocrine:    Disposition: ASSESSMENT: 83yFemale s/p fall onto left side with left 5-9 rib fractures and hemopneumothorax s/p left chest tube.    PLAN:   Neurologic: acute pain, h/o TIA   - Standing Tylenol and ibuprofen  - Prn oxycodone and Dilaudid for severe pain  - Will obtain acute pain consult for PCA if not adequately controlled    Respiratory:  left 5-9 rib fractures and hemopneumothorax s/p left chest tube, pulmonary fibrosis on 2L home O2  - Incentive spirometry, OOB to prevent atelectasis  - Home O2 2L nasal cannula  - Duonebs for pulmonary fibrosis, contact pharmacy to see if they carry pts Esbriet (Pirfenidone) and Incuse Ellipta    Cardiovascular: CHF, HTN, HLD  - Resume carvedilol for CHF, and resume other antihypertensives if BP stable   - Resume simvastatin  - Monitor vital signs    Gastrointestinal/Nutrition: no acute issues  - Consistent carb diet    Genitourinary/Renal: no acute issues  - NS @ 30, IV lock when eating   - Monitor I&Os    Hematologic: on ASA/Plavix at home  - Lovenox for VTE prophylaxis  - Resume ASA/Plavix when appropriate    Infectious Disease: no acute issues  - Culture if febrile    Endocrine: DM  - ISS premeal and at bedtime    Disposition: Monitor in SICU    -Elayne Ramirez PA-C  [id number]

## 2018-01-27 NOTE — H&P ADULT - HISTORY OF PRESENT ILLNESS
83 primarily Mandarian speaking female with PMHx DM, HTN, HLD, TIA (on ASA, Plavix) brought in s/p fall. Son at bedside who patient prefers to translate. Per son patient was in her bathroom and her legs gave way and she fell on her left side on the bathtub. Denies headstrike or LOC.  Was unable to get up by herself due to pain, had to wait for family to come and help her.    Primary Survey:   A - airway intact  B - bilateral breath sounds and good chest rise  C - initial BP: 184/88 (01-26-18 @ 23:10) , HR: 82 (01-26-18 @ 23:10), palpable pulses in all extremities  D - GCS 15 on arrival  Exposure obtained    Secondary Survey:   General: NAD  HEENT: Normocephalic, atraumatic, EOMI, PEERLA.  Neck: Soft, midline trachea, C-collar in place.   Chest: left lateral chest wall tenderness, thorax stable, no ecchymosis.   Cardiac: S1, S2, RRR.   Respiratory: Bilateral breath sounds, clear and equal bilaterally.  On nonrebreather  Abdomen: Soft, non-distended, non-tender, no rebound, no guarding, no ecchymosis.   Pelvis: Stable, non-tender.   Ext: palp radial b/l UE, b/l DP palp in Lower Extrem.   Back: no TTP, no palpable runoff/stepoff/deformity, no abrasions.

## 2018-01-27 NOTE — CONSULT NOTE ADULT - SUBJECTIVE AND OBJECTIVE BOX
Orthopaedic Surgery Consult Note    HPI:  83 primarily Mandarian speaking female with PMHx DM, HTN, HLD, TIA (on ASA, Plavix) brought in s/p fall. Son at bedside who patient prefers to translate. Per son patient was in her bathroom and her legs gave way and she fell on her left side on the bathtub. Denies headstrike or LOC.  Was unable to get up by herself due to pain, had to wait for family to come and help her.  Patient has h/o L humerus IMN at outside hospital ~10 years ago. Reports chronic pain since surgery. Has not followed with operating surgery recently. Denies numbness/tingling. No other bone/joint complaints.    PAST MEDICAL & SURGICAL HISTORY:  Congestive heart failure, unspecified congestive heart failure chronicity, unspecified congestive heart failure type  Pulmonary fibrosis: on 2L Home O2  JOE (acute kidney injury)  TIA (transient ischemic attack): on ASA/Plavix  HLD (hyperlipidemia)  HTN (hypertension)  DM (diabetes mellitus)  Cataract, acquired  Fracture dislocation of left ankle joint  Fracture of left upper extremity, sequela    [] No significant past history as reviewed with the patient and family    MEDICATIONS  (STANDING):  acetaminophen   Tablet. 650 milliGRAM(s) Oral every 6 hours  ALBUTerol/ipratropium for Nebulization 3 milliLiter(s) Nebulizer every 6 hours  carvedilol 12.5 milliGRAM(s) Oral every 12 hours  enoxaparin Injectable 40 milliGRAM(s) SubCutaneous daily  insulin lispro (HumaLOG) corrective regimen sliding scale   SubCutaneous three times a day before meals  insulin lispro (HumaLOG) corrective regimen sliding scale   SubCutaneous at bedtime  simvastatin 40 milliGRAM(s) Oral at bedtime  sodium chloride 0.9%. 1000 milliLiter(s) (50 mL/Hr) IV Continuous <Continuous>  trimethoprim  160 mG/sulfamethoxazole 800 mG 1 Tablet(s) Oral two times a day    MEDICATIONS  (PRN):  oxyCODONE    IR 5 milliGRAM(s) Oral every 4 hours PRN Moderate Pain (4 - 6)  oxyCODONE    IR 10 milliGRAM(s) Oral every 4 hours PRN Severe Pain (7 - 10)    Allergies    No Known Allergies    Intolerances        Vital Signs Last 24 Hrs  T(C): 36.4 (27 Jan 2018 15:00), Max: 37.1 (27 Jan 2018 07:00)  T(F): 97.6 (27 Jan 2018 15:00), Max: 98.7 (27 Jan 2018 07:00)  HR: 65 (27 Jan 2018 18:00) (62 - 87)  BP: 142/66 (27 Jan 2018 18:00) (109/59 - 186/89)  BP(mean): 95 (27 Jan 2018 18:00) (79 - 97)  RR: 19 (27 Jan 2018 18:00) (17 - 30)  SpO2: 97% (27 Jan 2018 18:00) (90% - 100%)    01-26 @ 07:01 - 01-27 @ 07:00  --------------------------------------------------------  IN: 200 mL / OUT: 250 mL / NET: -50 mL    01-27 @ 07:01 - 01-27 @ 22:05  --------------------------------------------------------  IN: 1300 mL / OUT: 460 mL / NET: 840 mL        PHYSICAL EXAM:  NAD  LUE: skin intact, no swelling/eccymosis/deformity  no ttp about arm, +ttp about shoulder  ROM limited by shoulder pain  motor intact AIN/PIN/U  SILT m/u/r, 2+ rad                          12.2   18.9  )-----------( 338      ( 27 Jan 2018 07:23 )             35.5     01-27    134<L>  |  99  |  25<H>  ----------------------------<  176<H>  4.6   |  25  |  1.44<H>    Ca    9.1      27 Jan 2018 07:23  Phos  4.3     01-27  Mg     1.8     01-27    TPro  7.1  /  Alb  3.5  /  TBili  0.6  /  DBili  x   /  AST  53<H>  /  ALT  20  /  AlkPhos  45  01-26    PT/INR - ( 27 Jan 2018 07:23 )   PT: 11.4 sec;   INR: 1.04 ratio         PTT - ( 27 Jan 2018 07:23 )  PTT:29.5 sec  Urinalysis Basic - ( 27 Jan 2018 07:52 )    Color: Yellow / Appearance: SL Turbid / SG: >1.030 / pH: x  Gluc: x / Ketone: Negative  / Bili: Negative / Urobili: Negative   Blood: x / Protein: 150 mg/dL / Nitrite: Negative   Leuk Esterase: Large / RBC: x / WBC >50 /HPF   Sq Epi: x / Non Sq Epi: OCC /HPF / Bacteria: Few /HPF    IMAGING STUDIES:  < from: Xray Humerus, Left (01.27.18 @ 08:51) >  IMPRESSION:     1.  Chronic appearing transverse fracture of the midshaft of the left   humerus, with possible acute on chronic fracture of the left humeral   intramedullary roshan.  2.  Displaced fracture of the inferior edge of the left scapula, which is   better appreciated on recent CT chest.  3.  Left rib fractures as described above.  4.  Subcutaneous emphysema in the left axilla.    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 No acute orthopedic intervention  - FU with Dr. Quezada as outpatient. Call [phone] for appointment

## 2018-01-27 NOTE — CONSULT NOTE ADULT - ATTENDING COMMENTS
Pt seen and examined.  Chart reviewed.  Resident note confirmed.  Pt is a 83 year old female with a medical history signifcant for TIA, HTN, CAD, CHF, pulmonary fibrosis and DM who presents to [facility] s/p fall. Pt was in her bathroom and fell.  She struck her left side on the bathtub. On presentation, she reports left sided chest pain and LUE pain.  She denies hitting her head or LOC.      PMH/PSH/MEDS/ALL/SH/FH/ROS: Unchanged from H&P  Vitals/PE/Labs/Radiographs:  Reviewed    A/p  Neuro:	Traumatic pain  	H/o TIA  	Start multimodal pain control  	Consider CT head and c-spine  	Mgmt per Trauma surgery    CVS:	CAD/HTN/CHF  	Continue cardiac monitoring  	Continue home regimen for mgmt of CHF    Pulm:	left hemopneumothorax, s/p chest tube  	Left 5-9 rib fx  	Pulmonary fibrosis  	Continue pleurovac to suction  	Continue ISP    GI:	No active issues  	Regular diet    :	CKD 4  	Monitor I's and O's    Heme:	No active issues  	Monitor H/h    ID:	No active issues  	Culutre for fever    Endo:	DM2  	Continue glycemic control .     Ortho:	LUE pain.  	LUE xray pending Pt seen and examined.  Chart reviewed.  Resident note confirmed.  Pt is a 83 year old female with a medical history signifcant for TIA, HTN, CAD, CHF, pulmonary fibrosis and DM who presents to [facility] s/p fall. Pt was in her bathroom and fell.  She struck her left side on the bathtub. On presentation, she reports left sided chest pain and LUE pain.  She denies hitting her head or LOC.      PMH/PSH/MEDS/ALL/SH/FH/ROS: Unchanged from H&P  Vitals/PE/Labs/Radiographs:  Reviewed    A/p  Neuro:	Traumatic pain  	H/o TIA  	Start multimodal pain control  	Consider CT head and c-spine  	Mgmt per Trauma surgery    CVS:	CAD/HTN/CHF  	Continue cardiac monitoring  	Continue home regimen for mgmt of CHF    Pulm:	left hemopneumothorax, s/p chest tube  	Left 5-9 rib fx  	Pulmonary fibrosis  	Continue pleurovac to suction  	Continue ISP    GI:	No active issues  	Regular diet    :	CKD 3  	Monitor I's and O's    Heme:	No active issues  	Monitor H/h    ID:	No active issues  	Culutre for fever    Endo:	DM2  	Continue glycemic control .     Ortho:	LUE pain.  	LUE xray pending

## 2018-01-27 NOTE — H&P ADULT - ATTENDING COMMENTS
Pt seen and examined. Agree with A/P. Admit to [facility], SICU. Monitor chest tube. Pain control. Pulmonary toilet.

## 2018-01-27 NOTE — H&P ADULT - NSHPLABSRESULTS_GEN_ALL_CORE
CBC (01-26 @ 21:49)                              13.6                           23.6<H>  )----------------(  331        83.0<H>% Neutrophils, 6.0<L>% Lymphocytes, ANC: 21.0<H>                              39.1      BMP (01-26 @ 21:49)             133<L>  |  98      |  22    		Ca++ --      Ca 9.5                ---------------------------------( 153<H>		Mg --                 5.1     |  24      |  1.22  			Ph --        LFTs (01-26 @ 21:49)      TPro 7.1 / Alb 3.5 / TBili 0.6 / DBili -- / AST 53<H> / ALT 20 / AlkPhos 45    Coags (01-26 @ 21:50)  aPTT 31.0 / INR 0.96 / PT 10.4    Cardiac Markers (01-26 @ 21:49)     Trop: -- -- / CKMB: -- / CK: 134      IMAGING:  CT Chest w/ IV Cont (01.26.18 @ 22:45) >  IMPRESSION: Displaced and overriding left lateral rib fractures. Left   hemopneumothorax involving approximately 20% of the pleural space.  Emphysema. Bilateral groundglass opacities are nonspecific and may be due   to edema or pulmonary contusion.

## 2018-01-27 NOTE — CHART NOTE - NSCHARTNOTEFT_GEN_A_CORE
Tertiary Trauma Survey (TTS)      HPI:  83 primarily Cantonese speaking female with PMHx DM, HTN, HLD, TIA (on ASA, Plavix) brought in s/p fall. Son at bedside who patient prefers to translate. Per son patient was in her bathroom and her legs gave way and she fell on her left side on the bathtub. Denies headstrike or LOC.  Was unable to get up by herself due to pain, had to wait for family to come and help her.    Primary Survey:   A - airway intact  B - bilateral breath sounds and good chest rise  C - initial BP: 184/88 (01-26-18 @ 23:10) , HR: 82 (01-26-18 @ 23:10), palpable pulses in all extremities  D - GCS 15 on arrival  Exposure obtained        PAST MEDICAL & SURGICAL HISTORY:  Congestive heart failure, unspecified congestive heart failure chronicity, unspecified congestive heart failure type  Pulmonary fibrosis: on 2L Home O2  JOE (acute kidney injury)  TIA (transient ischemic attack): on ASA/Plavix  HLD (hyperlipidemia)  HTN (hypertension)  DM (diabetes mellitus)  Cataract, acquired  Fracture dislocation of left ankle joint  Fracture of left upper extremity, sequela    [  ] No significant past history as reviewed with the patient and family    FAMILY HISTORY:  No pertinent family history in first degree relatives    [  ] Family history not pertinent as reviewed with the patient and family    SOCIAL HISTORY:    Medications (inpatient): acetaminophen   Tablet. 650 milliGRAM(s) Oral every 6 hours  ALBUTerol/ipratropium for Nebulization 3 milliLiter(s) Nebulizer every 6 hours  carvedilol 12.5 milliGRAM(s) Oral every 12 hours  enoxaparin Injectable 40 milliGRAM(s) SubCutaneous daily  insulin lispro (HumaLOG) corrective regimen sliding scale   SubCutaneous three times a day before meals  insulin lispro (HumaLOG) corrective regimen sliding scale   SubCutaneous at bedtime  simvastatin 40 milliGRAM(s) Oral at bedtime  sodium chloride 0.9%. 1000 milliLiter(s) IV Continuous <Continuous>  trimethoprim  160 mG/sulfamethoxazole 800 mG 1 Tablet(s) Oral two times a day    Medications (PRN):oxyCODONE    IR 5 milliGRAM(s) Oral every 4 hours PRN  oxyCODONE    IR 10 milliGRAM(s) Oral every 4 hours PRN    Allergies: No Known Allergies  (Intolerances: )    Vital Signs Last 24 Hrs  T(C): 36.4 (27 Jan 2018 15:00), Max: 37.1 (27 Jan 2018 07:00)  T(F): 97.6 (27 Jan 2018 15:00), Max: 98.7 (27 Jan 2018 07:00)  HR: 65 (27 Jan 2018 18:00) (62 - 87)  BP: 142/66 (27 Jan 2018 18:00) (109/59 - 186/89)  BP(mean): 95 (27 Jan 2018 18:00) (79 - 97)  RR: 19 (27 Jan 2018 18:00) (17 - 30)  SpO2: 97% (27 Jan 2018 18:00) (90% - 100%)  Drug Dosing Weight  Height (cm): 162.56 (23 Jan 2017 11:53)  Weight (kg): 66.5 (27 Jan 2018 03:35)  BMI (kg/m2): 25.2 (27 Jan 2018 03:35)  BSA (m2): 1.71 (27 Jan 2018 03:35)    Physical exam:  General: Laying in bed, in NAD AOx4  HEENT: PERRL, Normocephalic, atraumatic.   Neck: Soft, midline trachea, C-collar in place.   Chest: left lateral chest wall tenderness, thorax stable, no ecchymosis.   Cardiac: S1, S2, RRR.   Respiratory: CTAB  Abdomen: Soft, NT/ND, no rebound or gaurding  Pelvis: Stable, non-tender.   Ext: palp radial b/l UE, b/l DP palp in Lower Extrem  Back: no TTP, no palpable runoff/stepoff/deformity, no abrasions                        12.2   18.9  )-----------( 338      ( 27 Jan 2018 07:23 )             35.5     01-27    134<L>  |  99  |  25<H>  ----------------------------<  176<H>  4.6   |  25  |  1.44<H>    Ca    9.1      27 Jan 2018 07:23  Phos  4.3     01-27  Mg     1.8     01-27    TPro  7.1  /  Alb  3.5  /  TBili  0.6  /  DBili  x   /  AST  53<H>  /  ALT  20  /  AlkPhos  45  01-26    PT/INR - ( 27 Jan 2018 07:23 )   PT: 11.4 sec;   INR: 1.04 ratio         PTT - ( 27 Jan 2018 07:23 )  PTT:29.5 sec  Urinalysis Basic - ( 27 Jan 2018 07:52 )    Color: Yellow / Appearance: SL Turbid / SG: >1.030 / pH: x  Gluc: x / Ketone: Negative  / Bili: Negative / Urobili: Negative   Blood: x / Protein: 150 mg/dL / Nitrite: Negative   Leuk Esterase: Large / RBC: x / WBC >50 /HPF   Sq Epi: x / Non Sq Epi: OCC /HPF / Bacteria: Few /HPF        List Injuries Identified to Date:    List Operative and Interventional Radiological Procedures:     Consults (Date):  [  ] Neurosurgery   [X] Orthopedics  [  ] Plastics  [  ] Urology  [  ] PM&R  [  ] Social Work    RADIOLOGICAL FINDINGS REVIEW:   CT Cervical Spine No Cont (01.27.18 @ 10:42)    Head CT: No evidence for calvarial fracture or acute intracranial   hemorrhage. If symptoms persist, consider short interval follow-up head   CT or brain MRI follow-up if there are no MRI contraindications.    Cervical spine CT: No evidence for acute cervical spine fracture.   Degenerative changes. If symptoms persist, consider cervical spine MRI   follow-up if there are no MRI contraindications. Flexion and extension   radiographs may also be of value.    Xray Wrist 3 Views, Left (01.27.18 @ 08:58)    1.  Chronic appearing transverse fracture of the midshaft of the left   humerus, with possible acute on chronic fracture of the left humeral   intramedullary roshan.  2.  Displaced fracture of the inferior edge of the left scapula, which is   better appreciated on recent CT chest.  3.  Left rib fractures as described above.  4.  Subcutaneous emphysema in the left axilla.

## 2018-01-27 NOTE — ED ADULT NURSE REASSESSMENT NOTE - NS ED NURSE REASSESS COMMENT FT1
Patient is having Surgery consult at this time, patient has a pneumothorax left sided.  Cardiac monitoring initiated, patient placed on non-rebreather saturating at 100% at this time,  pleur evac chest tube system in place and attached to suction at this time.  Patient updated along with family as per plan of care.
Patient taken to CT scan.
2nd attempt by Surgery team to place chest tube left side.
Chest tube placed, 30ml dark red blood drained.
Chest tube placement left side in progress, confirmation xray completed, SICU to accept.

## 2018-01-27 NOTE — H&P ADULT - NSHPREVIEWOFSYSTEMS_GEN_ALL_CORE
General: No fevers/chills, normal appetite  HEENT: No blurry or double vision, no runny nose  Respiratory: No cough, no shortness of breath, left chest wall pain  Cardiovascular: No palpitations, no chest pain  Gastrointestinal: No abdominal pain, nausea, emesis, constipation, diarrhea, melena  Genitourinary: No dysuria, no urinary frequency  Integumentary: No rashes  Psych: Normal interactions

## 2018-01-28 LAB
ANION GAP SERPL CALC-SCNC: 13 MMOL/L — SIGNIFICANT CHANGE UP (ref 5–17)
ANION GAP SERPL CALC-SCNC: 9 MMOL/L — SIGNIFICANT CHANGE UP (ref 5–17)
APTT BLD: 34.6 SEC — SIGNIFICANT CHANGE UP (ref 27.5–37.4)
BUN SERPL-MCNC: 35 MG/DL — HIGH (ref 7–23)
BUN SERPL-MCNC: 37 MG/DL — HIGH (ref 7–23)
CALCIUM SERPL-MCNC: 8.5 MG/DL — SIGNIFICANT CHANGE UP (ref 8.4–10.5)
CALCIUM SERPL-MCNC: 8.6 MG/DL — SIGNIFICANT CHANGE UP (ref 8.4–10.5)
CHLORIDE SERPL-SCNC: 97 MMOL/L — SIGNIFICANT CHANGE UP (ref 96–108)
CHLORIDE SERPL-SCNC: 98 MMOL/L — SIGNIFICANT CHANGE UP (ref 96–108)
CO2 SERPL-SCNC: 23 MMOL/L — SIGNIFICANT CHANGE UP (ref 22–31)
CO2 SERPL-SCNC: 25 MMOL/L — SIGNIFICANT CHANGE UP (ref 22–31)
CREAT ?TM UR-MCNC: 84 MG/DL — SIGNIFICANT CHANGE UP
CREAT SERPL-MCNC: 1.69 MG/DL — HIGH (ref 0.5–1.3)
CREAT SERPL-MCNC: 1.93 MG/DL — HIGH (ref 0.5–1.3)
GLUCOSE BLDC GLUCOMTR-MCNC: 120 MG/DL — HIGH (ref 70–99)
GLUCOSE BLDC GLUCOMTR-MCNC: 164 MG/DL — HIGH (ref 70–99)
GLUCOSE BLDC GLUCOMTR-MCNC: 173 MG/DL — HIGH (ref 70–99)
GLUCOSE BLDC GLUCOMTR-MCNC: 178 MG/DL — HIGH (ref 70–99)
GLUCOSE SERPL-MCNC: 140 MG/DL — HIGH (ref 70–99)
GLUCOSE SERPL-MCNC: 167 MG/DL — HIGH (ref 70–99)
HCT VFR BLD CALC: 29.6 % — LOW (ref 34.5–45)
HCT VFR BLD CALC: 30.2 % — LOW (ref 34.5–45)
HGB BLD-MCNC: 10.2 G/DL — LOW (ref 11.5–15.5)
HGB BLD-MCNC: 10.4 G/DL — LOW (ref 11.5–15.5)
INR BLD: 1.02 RATIO — SIGNIFICANT CHANGE UP (ref 0.88–1.16)
MAGNESIUM SERPL-MCNC: 2.6 MG/DL — SIGNIFICANT CHANGE UP (ref 1.6–2.6)
MCHC RBC-ENTMCNC: 32.7 PG — SIGNIFICANT CHANGE UP (ref 27–34)
MCHC RBC-ENTMCNC: 32.9 PG — SIGNIFICANT CHANGE UP (ref 27–34)
MCHC RBC-ENTMCNC: 34.3 GM/DL — SIGNIFICANT CHANGE UP (ref 32–36)
MCHC RBC-ENTMCNC: 34.5 GM/DL — SIGNIFICANT CHANGE UP (ref 32–36)
MCV RBC AUTO: 95.3 FL — SIGNIFICANT CHANGE UP (ref 80–100)
MCV RBC AUTO: 95.4 FL — SIGNIFICANT CHANGE UP (ref 80–100)
OSMOLALITY UR: 465 MOS/KG — SIGNIFICANT CHANGE UP (ref 300–900)
PHOSPHATE SERPL-MCNC: 4.9 MG/DL — HIGH (ref 2.5–4.5)
PLATELET # BLD AUTO: 268 K/UL — SIGNIFICANT CHANGE UP (ref 150–400)
PLATELET # BLD AUTO: 277 K/UL — SIGNIFICANT CHANGE UP (ref 150–400)
POTASSIUM SERPL-MCNC: 4.5 MMOL/L — SIGNIFICANT CHANGE UP (ref 3.5–5.3)
POTASSIUM SERPL-MCNC: 4.6 MMOL/L — SIGNIFICANT CHANGE UP (ref 3.5–5.3)
POTASSIUM SERPL-SCNC: 4.5 MMOL/L — SIGNIFICANT CHANGE UP (ref 3.5–5.3)
POTASSIUM SERPL-SCNC: 4.6 MMOL/L — SIGNIFICANT CHANGE UP (ref 3.5–5.3)
PROTHROM AB SERPL-ACNC: 11 SEC — SIGNIFICANT CHANGE UP (ref 9.8–12.7)
RBC # BLD: 3.1 M/UL — LOW (ref 3.8–5.2)
RBC # BLD: 3.17 M/UL — LOW (ref 3.8–5.2)
RBC # FLD: 12.9 % — SIGNIFICANT CHANGE UP (ref 10.3–14.5)
RBC # FLD: 12.9 % — SIGNIFICANT CHANGE UP (ref 10.3–14.5)
SODIUM SERPL-SCNC: 132 MMOL/L — LOW (ref 135–145)
SODIUM SERPL-SCNC: 133 MMOL/L — LOW (ref 135–145)
SODIUM UR-SCNC: <20 MMOL/L — SIGNIFICANT CHANGE UP
WBC # BLD: 13.2 K/UL — HIGH (ref 3.8–10.5)
WBC # BLD: 14.1 K/UL — HIGH (ref 3.8–10.5)
WBC # FLD AUTO: 13.2 K/UL — HIGH (ref 3.8–10.5)
WBC # FLD AUTO: 14.1 K/UL — HIGH (ref 3.8–10.5)

## 2018-01-28 PROCEDURE — 71045 X-RAY EXAM CHEST 1 VIEW: CPT | Mod: 26

## 2018-01-28 RX ORDER — SODIUM CHLORIDE 9 MG/ML
1000 INJECTION, SOLUTION INTRAVENOUS
Qty: 0 | Refills: 0 | Status: DISCONTINUED | OUTPATIENT
Start: 2018-01-28 | End: 2018-01-29

## 2018-01-28 RX ORDER — UMECLIDINIUM 62.5 UG/1
1 AEROSOL, POWDER ORAL DAILY
Qty: 0 | Refills: 0 | Status: DISCONTINUED | OUTPATIENT
Start: 2018-01-28 | End: 2018-01-30

## 2018-01-28 RX ADMIN — ENOXAPARIN SODIUM 40 MILLIGRAM(S): 100 INJECTION SUBCUTANEOUS at 13:42

## 2018-01-28 RX ADMIN — Medication 2: at 18:00

## 2018-01-28 RX ADMIN — Medication 2: at 13:41

## 2018-01-28 RX ADMIN — Medication 650 MILLIGRAM(S): at 13:41

## 2018-01-28 RX ADMIN — Medication 650 MILLIGRAM(S): at 14:10

## 2018-01-28 RX ADMIN — Medication 650 MILLIGRAM(S): at 00:01

## 2018-01-28 RX ADMIN — Medication 650 MILLIGRAM(S): at 06:39

## 2018-01-28 RX ADMIN — SIMVASTATIN 40 MILLIGRAM(S): 20 TABLET, FILM COATED ORAL at 00:00

## 2018-01-28 RX ADMIN — CARVEDILOL PHOSPHATE 12.5 MILLIGRAM(S): 80 CAPSULE, EXTENDED RELEASE ORAL at 17:59

## 2018-01-28 RX ADMIN — Medication 1 TABLET(S): at 17:59

## 2018-01-28 RX ADMIN — Medication 2: at 08:24

## 2018-01-28 RX ADMIN — SIMVASTATIN 40 MILLIGRAM(S): 20 TABLET, FILM COATED ORAL at 22:01

## 2018-01-28 RX ADMIN — Medication 650 MILLIGRAM(S): at 01:08

## 2018-01-28 RX ADMIN — CARVEDILOL PHOSPHATE 12.5 MILLIGRAM(S): 80 CAPSULE, EXTENDED RELEASE ORAL at 06:39

## 2018-01-28 RX ADMIN — Medication 3 MILLILITER(S): at 18:00

## 2018-01-28 RX ADMIN — Medication 650 MILLIGRAM(S): at 18:30

## 2018-01-28 RX ADMIN — Medication 650 MILLIGRAM(S): at 07:00

## 2018-01-28 RX ADMIN — UMECLIDINIUM 1 PUFF(S): 62.5 AEROSOL, POWDER ORAL at 18:02

## 2018-01-28 RX ADMIN — Medication 650 MILLIGRAM(S): at 18:00

## 2018-01-28 RX ADMIN — Medication 3 MILLILITER(S): at 13:41

## 2018-01-28 RX ADMIN — OXYCODONE HYDROCHLORIDE 10 MILLIGRAM(S): 5 TABLET ORAL at 03:49

## 2018-01-28 RX ADMIN — OXYCODONE HYDROCHLORIDE 10 MILLIGRAM(S): 5 TABLET ORAL at 02:49

## 2018-01-28 RX ADMIN — Medication 1 TABLET(S): at 06:39

## 2018-01-28 RX ADMIN — Medication 3 MILLILITER(S): at 05:23

## 2018-01-28 NOTE — PROGRESS NOTE ADULT - ASSESSMENT
83F s/p fall with left 5-9th rib fractures and left hemopneumothorax  -Primary care per SICU  -Chest tube to water seal  -pain control  -incentive spirometry

## 2018-01-28 NOTE — PROGRESS NOTE ADULT - SUBJECTIVE AND OBJECTIVE BOX
Surgery Progress Note    24 hour events: pain management with IV PRN Dilaudid orthopedic surgery consult called for the mid shaft humeral fracture, no intervention needed. Kept on IVF 50 ml/hr for JOE. Chest tube placed to water seal overnight     T(C): 36.9 (01-28-18 @ 03:00), Max: 37.1 (01-27-18 @ 07:00)  HR: 66 (01-28-18 @ 06:00) (59 - 77)  BP: 140/70 (01-28-18 @ 06:00) (109/59 - 152/70)  RR: 23 (01-28-18 @ 06:00) (14 - 31)  SpO2: 96% (01-28-18 @ 06:00) (87% - 100%)  Wt(kg): --    01-26 @ 07:01  -  01-27 @ 07:00  --------------------------------------------------------  IN:    sodium chloride 0.9%.: 200 mL  Total IN: 200 mL    OUT:    Chest Tube: 50 mL    Voided: 200 mL  Total OUT: 250 mL    Total NET: -50 mL      01-27 @ 07:01  -  01-28 @ 06:33  --------------------------------------------------------  IN:    IV PiggyBack: 100 mL    Oral Fluid: 600 mL    sodium chloride 0.9%.: 1000 mL  Total IN: 1700 mL    OUT:    Voided: 910 mL  Total OUT: 910 mL    Total NET: 790 mL                              10.2   13.2  )-----------( 268      ( 28 Jan 2018 03:17 )             29.6     CAPILLARY BLOOD GLUCOSE      POCT Blood Glucose.: 141 mg/dL (27 Jan 2018 23:57)  POCT Blood Glucose.: 188 mg/dL (27 Jan 2018 18:06)  POCT Blood Glucose.: 149 mg/dL (27 Jan 2018 12:43)  POCT Blood Glucose.: 150 mg/dL (27 Jan 2018 09:16)      PE:   Gen: AAOX 3, NAD  Chest: CT in place to water seal  Abd: Soft, NT, ND

## 2018-01-28 NOTE — PROGRESS NOTE ADULT - SUBJECTIVE AND OBJECTIVE BOX
HISTORY OF PRESENT ILLNESS:  SARAH TEMPLE is a 83y Female primarily Mandarin speaking female with PMH of DM, HTN, HLD, TIA (on ASA, Plavix), CHF, pulmonary fibrosis on 2L home O2 who was brought in s/p fall. Pt's son at bedside and translating, as per son pt lives alone at home and ambulates without assistance inside her home, uses a Rollator walker when she goes out. She was in her bathroom and her legs felt weak and gave way, and she fell on her left side on the bathtub. Denies hitting her head or LOC.  Was unable to get up by herself due to pain. Someone was visiting and was unable to get inside the house, at that point they knew something was wrong and called the ambulance.     PAST MEDICAL HISTORY: JOE (acute kidney injury)  TIA (transient ischemic attack)  HLD (hyperlipidemia)  HTN (hypertension)  DM (diabetes mellitus)  CHF   pulmonary fibrosis on home O2 2L    PAST SURGICAL HISTORY: Cataract, acquired  Fracture dislocation of left ankle joint  Fracture of left upper extremity, sequela  No significant past surgical history      FAMILY HISTORY: No pertinent family history in first degree relatives    SOCIAL HISTORY: quit smoking >10 years ago, drinks alcohol on special occasions 1-2 times per month    CODE STATUS: Full code     HOME MEDICATIONS: Patient Currently Takes Medications as of 27-Jan-2018 00:35  · 	clopidogrel 75 mg oral tablet: 1 tab(s) orally once a day MDD:one  · 	aspirin 81 mg oral tablet, chewable: 1 tab(s) orally once a day, home & hosp  · 	simvastatin 40 mg oral tablet: 1 tab(s) orally once a day (at bedtime)  · 	losartan 100 mg oral tablet: 1 tab(s) orally once a day   · 	fenofibrate 67 mg oral capsule: 1 cap(s) orally once a day, home  · 	carvedilol 12.5 mg oral tablet: 1 tab(s) orally 2 times a day   · 	NIFEdipine 30 mg oral tablet, extended release: 1 tab(s) orally 2 times a day  · 	Esbriet 801 mg oral tablet: 1 tab(s) orally 3 times a day   · 	Janumet 50 mg-500 mg oral tablet: 1 tab(s) orally 2 times a week  · 	Incruse Ellipta 62.5 mcg/inh inhalation powder: inhaled once a day      ALLERGIES: No Known Allergies      VITAL SIGNS:  ICU Vital Signs Last 24 Hrs  T(C): 36.4 (27 Jan 2018 02:31), Max: 36.6 (26 Jan 2018 22:17)  T(F): 97.5 (27 Jan 2018 02:31), Max: 97.9 (26 Jan 2018 22:17)  HR: 76 (27 Jan 2018 02:31) (76 - 87)  BP: 132/75 (27 Jan 2018 02:31) (119/60 - 186/89)    RR: 18 (27 Jan 2018 02:31) (18 - 20)  SpO2: 100% (27 Jan 2018 02:31) (90% - 100%)      NEURO  Exam: sleeping comfortably, arousable, oriented x3  Meds:  acetaminophen   Tablet. 650 milliGRAM(s) Oral every 6 hours  ibuprofen  Tablet 400 milliGRAM(s) Oral every 6 hours  oxyCODONE    IR 5 milliGRAM(s) Oral every 4 hours PRN Moderate Pain (4 - 6)      RESPIRATORY  Exam: clear to auscultation bilaterally, left chest tube in place draining sanguinous fluid  Meds: ALBUTerol/ipratropium for Nebulization 3 milliLiter(s) Nebulizer every 6 hours      CARDIOVASCULAR  Exam: regular rate and rhythm  Cardiac Rhythm: sinus  Meds: x    GI/NUTRITION  Exam: soft, nontender, nondistended  Diet: Consistent carb regular diet  Meds: x    GENITOURINARY/RENAL  Meds: sodium chloride 0.9%. 1000 milliLiter(s) (50 mL/Hr) IV Continuous <Continuous>      01-26    133<L>  |  98  |  22  ----------------------------<  153<H>  5.1   |  24  |  1.22    Ca    9.5      26 Jan 2018 21:49    TPro  7.1  /  Alb  3.5  /  TBili  0.6  /  DBili  x   /  AST  53<H>  /  ALT  20  /  AlkPhos  45  01-26    [n/a ] Rowe catheter, indication: urine output monitoring in critically ill patient    HEMATOLOGIC  [x ] VTE Prophylaxis: enoxaparin Injectable 40 milliGRAM(s) SubCutaneous daily                          13.6   23.6  )-----------( 331      ( 26 Jan 2018 21:49 )             39.1     PT/INR - ( 26 Jan 2018 21:50 )   PT: 10.4 sec;   INR: 0.96 ratio         PTT - ( 26 Jan 2018 21:50 )  PTT:31.0 sec  Transfusion: [ ] PRBC	[ ] Platelets	[ ] FFP	[ ] Cryoprecipitate      INFECTIOUS DISEASES  Meds: x  RECENT CULTURES: x      ENDOCRINE  Meds: insulin lispro (HumaLOG) corrective regimen sliding scale   SubCutaneous three times a day before meals  insulin lispro (HumaLOG) corrective regimen sliding scale   SubCutaneous at bedtime  CAPILLARY BLOOD GLUCOSE          PATIENT CARE ACCESS DEVICES:  [x ] Peripheral IV  [ ] Central Venous Line	[ ] R	[ ] L	[ ] IJ	[ ] Fem	[ ] SC	Placed:   [ ] Arterial Line		[ ] R	[ ] L	[ ] Fem	[ ] Rad	[ ] Ax	Placed:   [ ] PICC:					[ ] Mediport  [ ] Urinary Catheter, Date Placed:   [x] Necessity of urinary, arterial, and venous catheters discussed    OTHER MEDICATIONS:  x    IMAGING STUDIES: < from: CT Chest w/ IV Cont (01.26.18 @ 22:45) >  IMPRESSION: Displaced and overriding left lateral rib fractures. Left   hemopneumothorax involving approximately 20% of the pleural space.    Emphysema. Bilateral groundglass opacities are nonspecific and may be due   to edema or pulmonary contusion.      Assessment and Recommendation:   · Assessment	  ASSESSMENT: 83yFemale s/p fall onto left side with left 5-9 rib fractures and hemopneumothorax s/p left chest tube.    PLAN:   Neurologic: acute pain, h/o TIA   - Standing Tylenol and ibuprofen  - Prn oxycodone and Dilaudid for severe pain  - Will obtain acute pain consult for PCA if not adequately controlled    Respiratory:  left 5-9 rib fractures and hemopneumothorax s/p left chest tube, pulmonary fibrosis on 2L home O2  - Incentive spirometry, OOB to prevent atelectasis  - Home O2 2L nasal cannula  - Duonebs for pulmonary fibrosis, contact pharmacy to see if they carry pts Esbriet (Pirfenidone) and Incuse Ellipta    Cardiovascular: CHF, HTN, HLD  - Resume carvedilol for CHF, and resume other antihypertensives if BP stable   - Resume simvastatin  - Monitor vital signs    Gastrointestinal/Nutrition: no acute issues  - Consistent carb diet    Genitourinary/Renal: no acute issues  - NS @ 30, IV lock when eating   - Monitor I&Os    Hematologic: on ASA/Plavix at home  - Lovenox for VTE prophylaxis  - Resume ASA/Plavix when appropriate    Infectious Disease: no acute issues  - Culture if febrile    Endocrine: DM  - ISS premeal and at bedtime    Disposition: Monitor in SICU 24 hour events: pain management with IV PRN Dilaudid orthopedic surgery consult called for the mid shaft humeral fracture, no intervention needed. Kept on IVF 50 ml/hr for JOE. Chest tube placed to water seal overnight           Vital Signs Last 24 Hrs  T(C): 36.9 (27 Jan 2018 23:00), Max: 37.1 (27 Jan 2018 07:00)  T(F): 98.4 (27 Jan 2018 23:00), Max: 98.7 (27 Jan 2018 07:00)  HR: 60 (28 Jan 2018 02:00) (59 - 77)  BP: 132/60 (28 Jan 2018 02:00) (109/59 - 152/70)  BP(mean): 86 (28 Jan 2018 02:00) (79 - 101)  RR: 14 (28 Jan 2018 02:00) (14 - 31)  SpO2: 100% (28 Jan 2018 02:00) (87% - 100%)      NEURO  Exam: sleeping comfortably, arousable, oriented x3  Meds:  acetaminophen   Tablet. 650 milliGRAM(s) Oral every 6 hours  oxyCODONE    IR 5 milliGRAM(s) Oral every 4 hours PRN Moderate Pain (4 - 6)      RESPIRATORY  Exam: clear to auscultation bilaterally, left chest tube in place draining sanguinous fluid  Meds: ALBUTerol/ipratropium for Nebulization 3 milliLiter(s) Nebulizer every 6 hours      CARDIOVASCULAR  Exam: regular rate and rhythm  Cardiac Rhythm: sinus  Meds: x    GI/NUTRITION  Exam: soft, nontender, nondistended  Diet: Consistent carb regular diet  Meds: x    GENITOURINARY/RENAL  Meds: sodium chloride 0.9%. 1000 milliLiter(s) (50 mL/Hr) IV Continuous <Continuous>      Labs:             INFECTIOUS DISEASES  Meds: x  RECENT CULTURES: x      ENDOCRINE  Meds: insulin lispro (HumaLOG) corrective regimen sliding scale   SubCutaneous three times a day before meals  insulin lispro (HumaLOG) corrective regimen sliding scale   SubCutaneous at bedtime  CAPILLARY BLOOD GLUCOSE          PATIENT CARE ACCESS DEVICES:  [x ] Peripheral IV  [ ] Central Venous Line	[ ] R	[ ] L	[ ] IJ	[ ] Fem	[ ] SC	Placed:   [ ] Arterial Line		[ ] R	[ ] L	[ ] Fem	[ ] Rad	[ ] Ax	Placed:   [ ] PICC:					[ ] Mediport  [ ] Urinary Catheter, Date Placed:   [x] Necessity of urinary, arterial, and venous catheters discussed    OTHER MEDICATIONS:  x    IMAGING STUDIES: < from: CT Chest w/ IV Cont (01.26.18 @ 22:45) >  IMPRESSION: Displaced and overriding left lateral rib fractures. Left   hemopneumothorax involving approximately 20% of the pleural space.    Emphysema. Bilateral groundglass opacities are nonspecific and may be due   to edema or pulmonary contusion.      Assessment and Recommendation:   · Assessment	  ASSESSMENT: 83yFemale s/p fall onto left side with left 5-9 rib fractures and hemopneumothorax s/p left chest tube.    PLAN:   Neurologic: acute pain, h/o TIA   - Standing Tylenol   - Prn oxycodone and Dilaudid for severe pain      Respiratory:  left 5-9 rib fractures and hemopneumothorax s/p left chest tube, pulmonary fibrosis on 2L home O2  - Incentive spirometry, OOB to prevent atelectasis  - Home O2 2L nasal cannula  - Duonebs for pulmonary fibrosis, contact pharmacy to see if they carry pts Esbriet (Pirfenidone) and Incuse Ellipta    Cardiovascular: CHF, HTN, HLD  - carvedilol for CHF, and resume other antihypertensives if BP stable   - Resume simvastatin    Gastrointestinal/Nutrition: no acute issues  - Consistent carb diet    Genitourinary/Renal: no acute issues  - IV lock when eating and JOE improves     Hematologic: on ASA/Plavix at home  - Lovenox for VTE prophylaxis  - Resume ASA/Plavix when appropriate    Infectious Disease: no acute issues  - Culture if febrile    Endocrine: DM  - ISS premeal and at bedtime    Disposition: Monitor in SICU 24 hour events: pain management with IV PRN Dilaudid orthopedic surgery consult called for the mid shaft humeral fracture, no intervention needed. Kept on IVF 50 ml/hr for JOE. Chest tube placed to water seal overnight       Vital Signs Last 24 Hrs  T(C): 36.9 (27 Jan 2018 23:00), Max: 37.1 (27 Jan 2018 07:00)  T(F): 98.4 (27 Jan 2018 23:00), Max: 98.7 (27 Jan 2018 07:00)  HR: 60 (28 Jan 2018 02:00) (59 - 77)  BP: 132/60 (28 Jan 2018 02:00) (109/59 - 152/70)  BP(mean): 86 (28 Jan 2018 02:00) (79 - 101)  RR: 14 (28 Jan 2018 02:00) (14 - 31)  SpO2: 100% (28 Jan 2018 02:00) (87% - 100%)      NEURO  Exam: sleeping comfortably, arousable, oriented x3  Meds:  acetaminophen   Tablet. 650 milliGRAM(s) Oral every 6 hours  oxyCODONE    IR 5 milliGRAM(s) Oral every 4 hours PRN Moderate Pain (4 - 6)    RESPIRATORY  Exam: clear to auscultation bilaterally, left chest tube in place draining sanguinous fluid  Meds: ALBUTerol/ipratropium for Nebulization 3 milliLiter(s) Nebulizer every 6 hours      CARDIOVASCULAR  Exam: regular rate and rhythm  Cardiac Rhythm: sinus  Meds: x    GI/NUTRITION  Exam: soft, nontender, nondistended  Diet: Consistent carb regular diet  Meds: x    GENITOURINARY/RENAL  Meds: sodium chloride 0.9%. 1000 milliLiter(s) (50 mL/Hr) IV Continuous <Continuous>    Labs:   Basic Metabolic Panel in AM (01.28.18 @ 03:17)    Sodium, Serum: 133 mmol/L    Potassium, Serum: 4.5 mmol/L    Chloride, Serum: 97 mmol/L    Carbon Dioxide, Serum: 23 mmol/L    Anion Gap, Serum: 13 mmol/L    Blood Urea Nitrogen, Serum: 35 mg/dL    Creatinine, Serum: 1.93 mg/dL    Glucose, Serum: 140 mg/dL    Calcium, Total Serum: 8.5 mg/dL        INFECTIOUS DISEASES  Meds: x  RECENT CULTURES: x      ENDOCRINE  Meds: insulin lispro (HumaLOG) corrective regimen sliding scale   SubCutaneous three times a day before meals  insulin lispro (HumaLOG) corrective regimen sliding scale   SubCutaneous at bedtime  CAPILLARY BLOOD GLUCOSE          PATIENT CARE ACCESS DEVICES:  [x ] Peripheral IV  [ ] Central Venous Line	[ ] R	[ ] L	[ ] IJ	[ ] Fem	[ ] SC	Placed:   [ ] Arterial Line		[ ] R	[ ] L	[ ] Fem	[ ] Rad	[ ] Ax	Placed:   [ ] PICC:					[ ] Mediport  [ ] Urinary Catheter, Date Placed:   [x] Necessity of urinary, arterial, and venous catheters discussed    OTHER MEDICATIONS:  x    IMAGING STUDIES: < from: CT Chest w/ IV Cont (01.26.18 @ 22:45) >  IMPRESSION: Displaced and overriding left lateral rib fractures. Left   hemopneumothorax involving approximately 20% of the pleural space.    Emphysema. Bilateral groundglass opacities are nonspecific and may be due   to edema or pulmonary contusion.      Assessment and Recommendation:   · Assessment	  ASSESSMENT: 83yFemale s/p fall onto left side with left 5-9 rib fractures and hemopneumothorax s/p left chest tube.    PLAN:   Neurologic: acute pain, h/o TIA   - Standing Tylenol   - Prn oxycodone and Dilaudid for severe pain      Respiratory:  left 5-9 rib fractures and hemopneumothorax s/p left chest tube, pulmonary fibrosis on 2L home O2  - Incentive spirometry, OOB to prevent atelectasis  - Home O2 2L nasal cannula  - Duonebs for pulmonary fibrosis, contact pharmacy to see if they carry pts Esbriet (Pirfenidone) and Incuse Ellipta    Cardiovascular: CHF, HTN, HLD  - carvedilol for CHF, and resume other antihypertensives if BP stable   - Resume simvastatin    Gastrointestinal/Nutrition: no acute issues  - Consistent carb diet    Genitourinary/Renal: no acute issues  - IV lock when eating and JOE improves     Hematologic: on ASA/Plavix at home  - Lovenox for VTE prophylaxis  - Resume ASA/Plavix when appropriate    Infectious Disease: no acute issues  - Culture if febrile    Endocrine: DM  - ISS premeal and at bedtime    Disposition: Monitor in SICU

## 2018-01-29 DIAGNOSIS — E78.5 HYPERLIPIDEMIA, UNSPECIFIED: ICD-10-CM

## 2018-01-29 DIAGNOSIS — J84.10 PULMONARY FIBROSIS, UNSPECIFIED: ICD-10-CM

## 2018-01-29 DIAGNOSIS — S22.39XA FRACTURE OF ONE RIB, UNSPECIFIED SIDE, INITIAL ENCOUNTER FOR CLOSED FRACTURE: ICD-10-CM

## 2018-01-29 DIAGNOSIS — E87.1 HYPO-OSMOLALITY AND HYPONATREMIA: ICD-10-CM

## 2018-01-29 DIAGNOSIS — N30.90 CYSTITIS, UNSPECIFIED WITHOUT HEMATURIA: ICD-10-CM

## 2018-01-29 DIAGNOSIS — E11.22 TYPE 2 DIABETES MELLITUS WITH DIABETIC CHRONIC KIDNEY DISEASE: ICD-10-CM
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
## 2018-01-29 DIAGNOSIS — J94.2 HEMOTHORAX: ICD-10-CM

## 2018-01-29 DIAGNOSIS — G45.9 TRANSIENT CEREBRAL ISCHEMIC ATTACK, UNSPECIFIED: ICD-10-CM

## 2018-01-29 DIAGNOSIS — I10 ESSENTIAL (PRIMARY) HYPERTENSION: ICD-10-CM

## 2018-01-29 LAB
ANION GAP SERPL CALC-SCNC: 10 MMOL/L — SIGNIFICANT CHANGE UP (ref 5–17)
BUN SERPL-MCNC: 31 MG/DL — HIGH (ref 7–23)
CA-I BLD-SCNC: 1.21 MMOL/L — SIGNIFICANT CHANGE UP (ref 1.12–1.3)
CALCIUM SERPL-MCNC: 8.5 MG/DL — SIGNIFICANT CHANGE UP (ref 8.4–10.5)
CHLORIDE SERPL-SCNC: 96 MMOL/L — SIGNIFICANT CHANGE UP (ref 96–108)
CO2 SERPL-SCNC: 23 MMOL/L — SIGNIFICANT CHANGE UP (ref 22–31)
CREAT SERPL-MCNC: 1.4 MG/DL — HIGH (ref 0.5–1.3)
CULTURE RESULTS: SIGNIFICANT CHANGE UP
GLUCOSE BLDC GLUCOMTR-MCNC: 134 MG/DL — HIGH (ref 70–99)
GLUCOSE BLDC GLUCOMTR-MCNC: 151 MG/DL — HIGH (ref 70–99)
GLUCOSE BLDC GLUCOMTR-MCNC: 173 MG/DL — HIGH (ref 70–99)
GLUCOSE BLDC GLUCOMTR-MCNC: 186 MG/DL — HIGH (ref 70–99)
GLUCOSE SERPL-MCNC: 149 MG/DL — HIGH (ref 70–99)
HCT VFR BLD CALC: 27.6 % — LOW (ref 34.5–45)
HGB BLD-MCNC: 8.9 G/DL — LOW (ref 11.5–15.5)
MAGNESIUM SERPL-MCNC: 2.1 MG/DL — SIGNIFICANT CHANGE UP (ref 1.6–2.6)
MCHC RBC-ENTMCNC: 30.3 PG — SIGNIFICANT CHANGE UP (ref 27–34)
MCHC RBC-ENTMCNC: 32.2 GM/DL — SIGNIFICANT CHANGE UP (ref 32–36)
MCV RBC AUTO: 93.9 FL — SIGNIFICANT CHANGE UP (ref 80–100)
PHOSPHATE SERPL-MCNC: 2.7 MG/DL — SIGNIFICANT CHANGE UP (ref 2.5–4.5)
PLATELET # BLD AUTO: 279 K/UL — SIGNIFICANT CHANGE UP (ref 150–400)
POTASSIUM SERPL-MCNC: 4.3 MMOL/L — SIGNIFICANT CHANGE UP (ref 3.5–5.3)
POTASSIUM SERPL-SCNC: 4.3 MMOL/L — SIGNIFICANT CHANGE UP (ref 3.5–5.3)
RBC # BLD: 2.94 M/UL — LOW (ref 3.8–5.2)
RBC # FLD: 14.7 % — HIGH (ref 10.3–14.5)
SODIUM SERPL-SCNC: 129 MMOL/L — LOW (ref 135–145)
SPECIMEN SOURCE: SIGNIFICANT CHANGE UP
WBC # BLD: 11.38 K/UL — HIGH (ref 3.8–10.5)
WBC # FLD AUTO: 11.38 K/UL — HIGH (ref 3.8–10.5)

## 2018-01-29 PROCEDURE — 99223 1ST HOSP IP/OBS HIGH 75: CPT

## 2018-01-29 PROCEDURE — 71045 X-RAY EXAM CHEST 1 VIEW: CPT | Mod: 26,77

## 2018-01-29 PROCEDURE — 71045 X-RAY EXAM CHEST 1 VIEW: CPT | Mod: 26,76

## 2018-01-29 PROCEDURE — 99233 SBSQ HOSP IP/OBS HIGH 50: CPT

## 2018-01-29 RX ORDER — LOSARTAN POTASSIUM 100 MG/1
100 TABLET, FILM COATED ORAL DAILY
Qty: 0 | Refills: 0 | Status: DISCONTINUED | OUTPATIENT
Start: 2018-01-29 | End: 2018-01-30

## 2018-01-29 RX ORDER — PIRFENIDONE 267 MG/1
1 TABLET, FILM COATED ORAL
Qty: 0 | Refills: 0 | COMMUNITY

## 2018-01-29 RX ORDER — SENNA PLUS 8.6 MG/1
2 TABLET ORAL AT BEDTIME
Qty: 0 | Refills: 0 | Status: DISCONTINUED | OUTPATIENT
Start: 2018-01-29 | End: 2018-01-30

## 2018-01-29 RX ORDER — CARVEDILOL PHOSPHATE 80 MG/1
1 CAPSULE, EXTENDED RELEASE ORAL
Qty: 0 | Refills: 0 | COMMUNITY

## 2018-01-29 RX ORDER — UMECLIDINIUM 62.5 UG/1
0 AEROSOL, POWDER ORAL
Qty: 0 | Refills: 0 | COMMUNITY

## 2018-01-29 RX ORDER — NIFEDIPINE 30 MG
30 TABLET, EXTENDED RELEASE 24 HR ORAL DAILY
Qty: 0 | Refills: 0 | Status: DISCONTINUED | OUTPATIENT
Start: 2018-01-29 | End: 2018-01-30

## 2018-01-29 RX ORDER — ASPIRIN/CALCIUM CARB/MAGNESIUM 324 MG
81 TABLET ORAL DAILY
Qty: 0 | Refills: 0 | Status: DISCONTINUED | OUTPATIENT
Start: 2018-01-29 | End: 2018-01-29

## 2018-01-29 RX ORDER — CHOLECALCIFEROL (VITAMIN D3) 125 MCG
1000 CAPSULE ORAL DAILY
Qty: 0 | Refills: 0 | Status: DISCONTINUED | OUTPATIENT
Start: 2018-01-29 | End: 2018-01-30

## 2018-01-29 RX ORDER — LIDOCAINE 4 G/100G
1 CREAM TOPICAL DAILY
Qty: 0 | Refills: 0 | Status: DISCONTINUED | OUTPATIENT
Start: 2018-01-29 | End: 2018-01-30

## 2018-01-29 RX ORDER — FENOFIBRATE,MICRONIZED 130 MG
1 CAPSULE ORAL
Qty: 0 | Refills: 0 | COMMUNITY

## 2018-01-29 RX ORDER — SITAGLIPTIN AND METFORMIN HYDROCHLORIDE 500; 50 MG/1; MG/1
1 TABLET, FILM COATED ORAL
Qty: 0 | Refills: 0 | COMMUNITY

## 2018-01-29 RX ORDER — LOSARTAN POTASSIUM 100 MG/1
1 TABLET, FILM COATED ORAL
Qty: 0 | Refills: 0 | COMMUNITY

## 2018-01-29 RX ORDER — SIMVASTATIN 20 MG/1
1 TABLET, FILM COATED ORAL
Qty: 0 | Refills: 0 | COMMUNITY

## 2018-01-29 RX ORDER — DOCUSATE SODIUM 100 MG
100 CAPSULE ORAL THREE TIMES A DAY
Qty: 0 | Refills: 0 | Status: DISCONTINUED | OUTPATIENT
Start: 2018-01-29 | End: 2018-01-30

## 2018-01-29 RX ORDER — NIFEDIPINE 30 MG
1 TABLET, EXTENDED RELEASE 24 HR ORAL
Qty: 0 | Refills: 0 | COMMUNITY

## 2018-01-29 RX ORDER — ASPIRIN/CALCIUM CARB/MAGNESIUM 324 MG
81 TABLET ORAL DAILY
Qty: 0 | Refills: 0 | Status: DISCONTINUED | OUTPATIENT
Start: 2018-01-30 | End: 2018-01-30

## 2018-01-29 RX ADMIN — Medication 650 MILLIGRAM(S): at 01:26

## 2018-01-29 RX ADMIN — UMECLIDINIUM 1 PUFF(S): 62.5 AEROSOL, POWDER ORAL at 13:19

## 2018-01-29 RX ADMIN — Medication 2: at 07:52

## 2018-01-29 RX ADMIN — Medication 650 MILLIGRAM(S): at 06:15

## 2018-01-29 RX ADMIN — Medication 3 MILLILITER(S): at 13:20

## 2018-01-29 RX ADMIN — Medication 650 MILLIGRAM(S): at 17:42

## 2018-01-29 RX ADMIN — ENOXAPARIN SODIUM 40 MILLIGRAM(S): 100 INJECTION SUBCUTANEOUS at 13:17

## 2018-01-29 RX ADMIN — Medication 650 MILLIGRAM(S): at 05:42

## 2018-01-29 RX ADMIN — Medication 100 MILLIGRAM(S): at 13:21

## 2018-01-29 RX ADMIN — Medication 62.5 MILLIMOLE(S): at 15:07

## 2018-01-29 RX ADMIN — SENNA PLUS 2 TABLET(S): 8.6 TABLET ORAL at 22:41

## 2018-01-29 RX ADMIN — LIDOCAINE 1 PATCH: 4 CREAM TOPICAL at 15:03

## 2018-01-29 RX ADMIN — Medication 650 MILLIGRAM(S): at 00:59

## 2018-01-29 RX ADMIN — CARVEDILOL PHOSPHATE 12.5 MILLIGRAM(S): 80 CAPSULE, EXTENDED RELEASE ORAL at 05:42

## 2018-01-29 RX ADMIN — Medication 3 MILLILITER(S): at 17:42

## 2018-01-29 RX ADMIN — Medication 650 MILLIGRAM(S): at 17:53

## 2018-01-29 RX ADMIN — Medication 650 MILLIGRAM(S): at 22:40

## 2018-01-29 RX ADMIN — Medication 650 MILLIGRAM(S): at 13:47

## 2018-01-29 RX ADMIN — Medication 650 MILLIGRAM(S): at 13:17

## 2018-01-29 RX ADMIN — Medication 1 TABLET(S): at 05:42

## 2018-01-29 RX ADMIN — Medication 3 MILLILITER(S): at 05:42

## 2018-01-29 RX ADMIN — Medication 3 MILLILITER(S): at 22:40

## 2018-01-29 RX ADMIN — Medication 3 MILLILITER(S): at 00:59

## 2018-01-29 RX ADMIN — Medication 1000 UNIT(S): at 15:01

## 2018-01-29 RX ADMIN — Medication 2: at 17:41

## 2018-01-29 RX ADMIN — Medication 650 MILLIGRAM(S): at 23:28

## 2018-01-29 RX ADMIN — Medication 100 MILLIGRAM(S): at 22:41

## 2018-01-29 RX ADMIN — SIMVASTATIN 40 MILLIGRAM(S): 20 TABLET, FILM COATED ORAL at 22:40

## 2018-01-29 RX ADMIN — Medication 1 TABLET(S): at 17:42

## 2018-01-29 RX ADMIN — Medication 2: at 13:17

## 2018-01-29 NOTE — CONSULT NOTE ADULT - PROBLEM SELECTOR RECOMMENDATION 10
Started on bactrim in SICU. Seems to be tolerating well, although may be contributing to hyponatremia.  1. Continue for now, although not first choice in this elderly patient with renal insufficiency.  2. Trend BMP - if creatinine increases, stop bactrim.  3. UCx not sent.

## 2018-01-29 NOTE — PHYSICAL THERAPY INITIAL EVALUATION ADULT - RANGE OF MOTION EXAMINATION, REHAB EVAL
unable to assess L UE 2/2 pain/bilateral lower extremity ROM was WFL (within functional limits)/bilateral upper extremity ROM was WFL (within functional limits)

## 2018-01-29 NOTE — PHYSICAL THERAPY INITIAL EVALUATION ADULT - PERTINENT HX OF CURRENT PROBLEM, REHAB EVAL
83F s/p fall with left 5-9th rib fractures and left hemopneumothorax. Pt also with L mid-shaft humerus non-union fracture

## 2018-01-29 NOTE — CONSULT NOTE ADULT - PROBLEM SELECTOR RECOMMENDATION 3
1. Add vitamin D supplementation.  2. PT evaluation.  3. Check orthostatics.  4. Monitor blood glucose.

## 2018-01-29 NOTE — PROGRESS NOTE ADULT - ASSESSMENT
83F s/p fall with left 5-9th rib fractures and left hemopneumothorax  -Primary care per SICU  - F/U AM CXR, may DC chest tube today  -pain control  -incentive spirometry 83F s/p fall with left 5-9th rib fractures and left hemopneumothorax  -Primary care per SICU  - F/U AM CXR, may DC chest tube today  - Pain control  - Incentive spirometry  - F/U PT reccs

## 2018-01-29 NOTE — CONSULT NOTE ADULT - PROBLEM SELECTOR RECOMMENDATION 2
1. Multimodal pain control with tylenol ATC, lidoderm patch.  2. Avoid NSAIDs given renal insufficiency.  3. Low dose oxycodone PRN for pain.  4. Add senna/colace for bowel regimen.  5. Add vitamin D 1000 IU daily.  6. Will need outpatient osteoporosis screening.

## 2018-01-29 NOTE — CONSULT NOTE ADULT - PROBLEM SELECTOR RECOMMENDATION 4
with chronic hypoxic resp. failure and pulmonary HTN  1. Resume home medications as ordered.  2. O2 at 2L via nasal cannula.

## 2018-01-29 NOTE — CONSULT NOTE ADULT - PROBLEM SELECTOR RECOMMENDATION 7
1. Resume home regimen: Losartan 100 mg PO daily; Nifedipine ER 30 mg PO daily  2. D/C coreg - this was an old medication prescribed one year ago while patient was being worked up for CHF. The patient DOES NOT have a history of heart failure.

## 2018-01-29 NOTE — PROGRESS NOTE ADULT - SUBJECTIVE AND OBJECTIVE BOX
GENERAL SURGERY DAILY PROGRESS NOTE    Subjective:  NAEO. Patient transferred from floor to SICU yesterday in stable condition. This AM pt endorses some back pain, which is well controlled.         Objective:    PE:  Gen: AAOX 3, NAD  Chest: CT in place   Abd: Soft, NT, ND      Vital Signs Last 24 Hrs  T(C): 36.4 (29 Jan 2018 05:29), Max: 36.9 (28 Jan 2018 11:18)  T(F): 97.6 (29 Jan 2018 05:29), Max: 98.4 (28 Jan 2018 11:18)  HR: 79 (29 Jan 2018 05:29) (58 - 80)  BP: 110/64 (29 Jan 2018 05:29) (86/51 - 135/64)  BP(mean): 75 (28 Jan 2018 10:00) (66 - 92)  RR: 20 (29 Jan 2018 05:29) (11 - 24)  SpO2: 96% (29 Jan 2018 00:04) (94% - 100%)    I&O's Detail    27 Jan 2018 07:01  -  28 Jan 2018 07:00  --------------------------------------------------------  IN:    IV PiggyBack: 100 mL    Oral Fluid: 600 mL    sodium chloride 0.9%: 1200 mL  Total IN: 1900 mL    OUT:    Chest Tube: 90 mL    Voided: 1210 mL  Total OUT: 1300 mL    Total NET: 600 mL      28 Jan 2018 07:01  -  29 Jan 2018 06:36  --------------------------------------------------------  IN:    lactated ringers.: 300 mL    Oral Fluid: 560 mL    sodium chloride 0.9%: 150 mL  Total IN: 1010 mL    OUT:    Chest Tube: 150 mL    Voided: 600 mL  Total OUT: 750 mL    Total NET: 260 mL          Daily     Daily     MEDICATIONS  (STANDING):  acetaminophen   Tablet. 650 milliGRAM(s) Oral every 6 hours  ALBUTerol/ipratropium for Nebulization 3 milliLiter(s) Nebulizer every 6 hours  carvedilol 12.5 milliGRAM(s) Oral every 12 hours  enoxaparin Injectable 40 milliGRAM(s) SubCutaneous daily  insulin lispro (HumaLOG) corrective regimen sliding scale   SubCutaneous three times a day before meals  insulin lispro (HumaLOG) corrective regimen sliding scale   SubCutaneous at bedtime  lactated ringers. 1000 milliLiter(s) (50 mL/Hr) IV Continuous <Continuous>  Pirfenidone 801 milliGRAM(s) 801 milliGRAM(s) Oral three times a day  simvastatin 40 milliGRAM(s) Oral at bedtime  trimethoprim  160 mG/sulfamethoxazole 800 mG 1 Tablet(s) Oral two times a day  umeclidinium 62.5 MICROgram(s) Inhaler 1 Puff(s) Inhalation daily    MEDICATIONS  (PRN):  oxyCODONE    IR 5 milliGRAM(s) Oral every 4 hours PRN Moderate Pain (4 - 6)  oxyCODONE    IR 10 milliGRAM(s) Oral every 4 hours PRN Severe Pain (7 - 10)      LABS:                        10.4   14.1  )-----------( 277      ( 28 Jan 2018 16:16 )             30.2     01-28    132<L>  |  98  |  37<H>  ----------------------------<  167<H>  4.6   |  25  |  1.69<H>    Ca    8.6      28 Jan 2018 16:16  Phos  4.9     01-28  Mg     2.6     01-28      PT/INR - ( 28 Jan 2018 03:17 )   PT: 11.0 sec;   INR: 1.02 ratio         PTT - ( 28 Jan 2018 03:17 )  PTT:34.6 sec  Urinalysis Basic - ( 27 Jan 2018 07:52 )    Color: Yellow / Appearance: SL Turbid / SG: >1.030 / pH: x  Gluc: x / Ketone: Negative  / Bili: Negative / Urobili: Negative   Blood: x / Protein: 150 mg/dL / Nitrite: Negative   Leuk Esterase: Large / RBC: x / WBC >50 /HPF   Sq Epi: x / Non Sq Epi: OCC /HPF / Bacteria: Few /HPF        RADIOLOGY & ADDITIONAL STUDIES:

## 2018-01-29 NOTE — PHYSICAL THERAPY INITIAL EVALUATION ADULT - ADDITIONAL COMMENTS
Pt lives alone in private apartment with no stairs to negotiate. PTA, pt was independent with all mobility with use of rolling walker.

## 2018-01-29 NOTE — CONSULT NOTE ADULT - PROBLEM SELECTOR RECOMMENDATION 9
1. Chest tube management per neurosurgery.  2. Trend CBC.  3. Serial CXR imaging until apical PTX is resolved.  4. O2 support is at baseline.  5. Resume ASA/plavix once H/H is deemed stable. Likely chronic and mild, worsened by pain. Suspect SIADH. Can be worsened by Bactrim.  1. D/C IVF  2. Repeat BMP in AM.  3. Encourage PO intake.

## 2018-01-29 NOTE — CONSULT NOTE ADULT - SUBJECTIVE AND OBJECTIVE BOX
TRAUMA/ACUTE CARE SURGERY - HOSPITAL MEDICINE CO-MANAGEMENT INITIAL VISIT NOTE    PMD: Dr. Griffin    Family at bedside for Mandarin translation at their request    CHIEF COMPLAINT: Patient is a 83y old  Female who presents with a chief complaint of fall, rib pain.    HPI: Ms. Dong is an 83 year old female with a PMHx pulmonary fibrosis on home O2 with pulmonary hypertension, TIA 8 years ago on ASA/plavix, CKD stage III, T2DM, HTN who presented initially to our hospital on 1/27/18 after a fall at home. Per the patient and family at bedside, the fall occurred in the patients bathroom after she felt her legs give way. She landed on her left side. Due to pain, she was unable to get up, and waited for family to help. She normally ambulates with a walker. The family does not report a recent change in her ability to ambulate. She checks her FS regularly and there have been no episodes of hypoglycemia. She did not have chest pain or palpitations prior to the fall. There was no LOC per the patient. Her O2 requirements have not changed. There have been no recent changes to her medications.    After the fall, she had left sided rib/back pain and left arm pain. She was found to have multiple rib fractures, hemopneumothorax, a left scapular body fracture and left midshaft humeral fracture. She is s/p SICU observation and chest tube placement, which was removed today. At present, she reports 7/10 left sided rib/back pain that worsens upon deep inspiration. She has not gotten out of bed yet today. She has not had a bowel movement since her admission.    Allergies: No Known Allergies    Intolerances: Denies        HOME MEDICATIONS: [X] Reviewed with family at bedside  Home Medications:  aspirin 81 mg oral tablet, chewable: 1 tab(s) orally once a day, home &amp; hosp (27 Jan 2018 00:35)  carvedilol 12.5 mg oral tablet: 1 tab(s) orally 2 times a day (27 Jan 2018 01:33)  Esbriet 801 mg oral tablet: 1 tab(s) orally 3 times a day (27 Jan 2018 01:33)  fenofibrate 67 mg oral capsule: 1 cap(s) orally once a day, home (27 Jan 2018 00:35)  Incruse Ellipta 62.5 mcg/inh inhalation powder: inhaled once a day (27 Jan 2018 01:33)  Janumet 50 mg-500 mg oral tablet: 1 tab(s) orally 2 times a week (27 Jan 2018 01:33)  losartan 100 mg oral tablet: 1 tab(s) orally once a day (27 Jan 2018 00:35)  NIFEdipine 30 mg oral tablet, extended release: 1 tab(s) orally 2 times a day (27 Jan 2018 01:33)  simvastatin 40 mg oral tablet: 1 tab(s) orally once a day (at bedtime) (27 Jan 2018 00:35)      MEDICATIONS  (STANDING):  acetaminophen   Tablet. 650 milliGRAM(s) Oral every 6 hours  ALBUTerol/ipratropium for Nebulization 3 milliLiter(s) Nebulizer every 6 hours  carvedilol 12.5 milliGRAM(s) Oral every 12 hours  enoxaparin Injectable 40 milliGRAM(s) SubCutaneous daily  insulin lispro (HumaLOG) corrective regimen sliding scale   SubCutaneous three times a day before meals  insulin lispro (HumaLOG) corrective regimen sliding scale   SubCutaneous at bedtime  lactated ringers. 1000 milliLiter(s) (50 mL/Hr) IV Continuous <Continuous>  Pirfenidone 801 milliGRAM(s) 801 milliGRAM(s) Oral three times a day  simvastatin 40 milliGRAM(s) Oral at bedtime  trimethoprim  160 mG/sulfamethoxazole 800 mG 1 Tablet(s) Oral two times a day  umeclidinium 62.5 MICROgram(s) Inhaler 1 Puff(s) Inhalation daily    MEDICATIONS  (PRN):  oxyCODONE    IR 5 milliGRAM(s) Oral every 4 hours PRN Moderate Pain (4 - 6)  oxyCODONE    IR 10 milliGRAM(s) Oral every 4 hours PRN Severe Pain (7 - 10)      PAST MEDICAL & SURGICAL HISTORY:  Pulmonary fibrosis: on 2L Home O2  JOE (acute kidney injury)  TIA (transient ischemic attack): on ASA/Plavix  HLD (hyperlipidemia)  HTN (hypertension)  DM (diabetes mellitus)  CKD stage III  Cataract, acquired  Fracture dislocation of left ankle joint with surgical repair  Fracture of left upper extremity, sequela, with surgical repair  [X] Reviewed     Functional Assessment: [ ] Independent  [X] Assistance  [ ] Total care  [ ] Non-ambulatory  As above    SOCIAL HISTORY:  Residence: [ ] [facility]  [ ] SNF  [X] Community  [ ] Substance abuse: Denies history of  [ ] Tobacco: Former smoker, quit many decades ago   [ ] Alcohol use: Denies history of    FAMILY HISTORY:  Unclear as patient was estranged from family at early age    REVIEW OF SYSTEMS:    CONSTITUTIONAL: No fever, weight loss, or fatigue  EYES: No eye pain, visual disturbances, or discharge  ENMT:  No difficulty hearing, tinnitus, vertigo; No sinus or throat pain  NECK: No pain or stiffness  RESPIRATORY: No cough, wheezing, chills or hemoptysis; +SOB  CARDIOVASCULAR: No chest pain, palpitations, dizziness, or leg swelling  GASTROINTESTINAL: No abdominal or epigastric pain. No nausea, vomiting, or hematemesis; No diarrhea or constipation.  GENITOURINARY: No dysuria, frequency, hematuria, or incontinence  NEUROLOGICAL: No headaches, memory loss, loss of strength, numbness, or tremors  SKIN: No itching, burning, rashes, or lesions   LYMPH NODES: No enlarged glands  ENDOCRINE: No heat or cold intolerance; No hair loss  MUSCULOSKELETAL: See HPI  PSYCHIATRIC: No depression, anxiety, mood swings, or difficulty sleeping  HEME/LYMPH: No easy bruising, or bleeding gums  ALLERGY AND IMMUNOLOGIC: No hives or eczema    [  ] All other ROS negative  [  ] Unable to obtain due to poor mental status    PHYSICAL EXAM:    Vital Signs Last 24 Hrs  T(C): 36.7 (29 Jan 2018 08:18), Max: 36.7 (28 Jan 2018 15:56)  T(F): 98.1 (29 Jan 2018 08:18), Max: 98.1 (29 Jan 2018 08:18)  HR: 85 (29 Jan 2018 08:18) (65 - 85)  BP: 101/60 (29 Jan 2018 08:18) (101/60 - 128/73)  BP(mean): --  RR: 18 (29 Jan 2018 08:18) (18 - 20)  SpO2: 94% (29 Jan 2018 08:18) (94% - 96%)    GENERAL: NAD, well-groomed, well-developed  HEAD:  Atraumatic, Normocephalic  EYES: EOMI, PERRLA, conjunctiva and sclera clear  ENMT: Moist mucous membranes  NECK: Supple, No JVD  RESPIRATORY: LLL rales noted. No wheezing.  CARDIOVASCULAR: Regular rate and rhythm; No murmurs, rubs, or gallops  GASTROINTESTINAL: Soft, Nontender, Nondistended; Bowel sounds present  EXTREMITIES:  2+ Peripheral Pulses, No clubbing, cyanosis, or edema  NERVOUS SYSTEM:  Alert & Oriented X3; Moving all 4 extremities; No gross sensory deficits  HEME/LYMPH: No lymphadenopathy noted  SKIN: S/P left sided chest tube, with bandage overlying area. Has ecchymoses on skin in area of trauma (left chest wall/arm)    LABS:                        8.9    11.38 )-----------( 279      ( 29 Jan 2018 08:49 )             27.6     Hemoglobin: 8.9 g/dL (01-29 @ 08:49)  Hemoglobin: 10.4 g/dL (01-28 @ 16:16)  Hemoglobin: 10.2 g/dL (01-28 @ 03:17)  Hemoglobin: 12.2 g/dL (01-27 @ 07:23)  Hemoglobin: 13.6 g/dL (01-26 @ 21:49)    01-29    129<L>  |  96  |  31<H>  ----------------------------<  149<H>  4.3   |  23  |  1.40<H>    Ca    8.5      29 Jan 2018 08:52  Phos  2.7     01-29  Mg     2.1     01-29      PT/INR - ( 28 Jan 2018 03:17 )   PT: 11.0 sec;   INR: 1.02 ratio         PTT - ( 28 Jan 2018 03:17 )  PTT:34.6 sec    CAPILLARY BLOOD GLUCOSE      POCT Blood Glucose.: 186 mg/dL (29 Jan 2018 12:05)        RADIOLOGY & ADDITIONAL STUDIES:    EKG:   Personally Reviewed:  [X] YES   NSR at 75 bpm with poor R wave progression in anterior leads    Imaging:   Personally Reviewed:  [X] YES   CTH:  No evidence for calvarial fracture or acute intracranial   hemorrhage.    CT C-Spine:  No evidence for acute cervical spine fracture.   Degenerative changes.    CT Chest/Abd/Pelvis:  Displaced and overriding left lateral rib fractures. Left   hemopneumothorax involving approximately 20% of the pleural space.    Emphysema. Bilateral groundglass opacities are nonspecific and may be due   to edema or pulmonary contusion.    CXR:  Trace left apical pneumothorax with chest tube in unchanged position.    Bilateral reticular opacities.    L Arm XRs:  1.  Chronic appearing transverse fracture of the midshaft of the left   humerus, with possible acute on chronic fracture of the left humeral   intramedullary roshan.  2.  Displaced fracture of the inferior edge of the left scapula, which is   better appreciated on recent CT chest.  3.  Left rib fractures as described above.  4.  Subcutaneous emphysema in the left axilla              [ ] Consultant(s) Notes Reviewed  [x] Care Discussed with Consultants/Other Providers: Trauma Team    [X] Fall risks identified: Advanced age, Debility    [ ] High risk medications identified:    [X] Probable osteoporosis    [ ] Possible osteomalacia    [X] Increased delirium risk    [X] Delirium and other risks can be reduced by:          -early ambulation          -minimizing "tethers" - IV, oxygen, catheters, etc          -avoiding hypnotics and sedatives          -maintaining hydration/nutrition          -avoid anticholinergics - diphenhydramine, etc          -pain control          -supportive environment    Advanced Directives: Full Code

## 2018-01-29 NOTE — CONSULT NOTE ADULT - ASSESSMENT
Ms. Dong is an 83 year old female with a PMHx pulmonary fibrosis on home O2 with pulmonary hypertension, TIA 8 years ago on ASA/plavix, CKD stage III, T2DM, HTN who presented initially to our hospital on 1/27/18 after a fall at home, found to have multiple left sided rib fractures, hemopneumothorax s/p chest tube, and acute appearing scapular fracture. Etiology of fall appears mechanical in nature in setting of debility. EKG without acute ischemic changes.

## 2018-01-29 NOTE — PROGRESS NOTE ADULT - ATTENDING COMMENTS
seen and examined 1/29/2018 @ 1000    left 5-9 displaced posterolateral rib fractures with hemothorax and pneumothorax  -pain controlled with Tylenol  -pneumothorax resolved on CXR and no air leak  -<100 cc serosanguinous drainage from chest tube overnight    chest tube removed and f/u X-ray shows only small apical pneumothorax    on home oxygen for pulmonary fibrosis  -PT evaluation for EDILMA placement

## 2018-01-30 VITALS
HEART RATE: 68 BPM | SYSTOLIC BLOOD PRESSURE: 126 MMHG | RESPIRATION RATE: 18 BRPM | OXYGEN SATURATION: 94 % | DIASTOLIC BLOOD PRESSURE: 59 MMHG | TEMPERATURE: 98 F

## 2018-01-30 LAB
ANION GAP SERPL CALC-SCNC: 11 MMOL/L — SIGNIFICANT CHANGE UP (ref 5–17)
BUN SERPL-MCNC: 22 MG/DL — SIGNIFICANT CHANGE UP (ref 7–23)
CALCIUM SERPL-MCNC: 8.3 MG/DL — LOW (ref 8.4–10.5)
CHLORIDE SERPL-SCNC: 96 MMOL/L — SIGNIFICANT CHANGE UP (ref 96–108)
CO2 SERPL-SCNC: 24 MMOL/L — SIGNIFICANT CHANGE UP (ref 22–31)
CREAT SERPL-MCNC: 1.29 MG/DL — SIGNIFICANT CHANGE UP (ref 0.5–1.3)
GLUCOSE BLDC GLUCOMTR-MCNC: 116 MG/DL — HIGH (ref 70–99)
GLUCOSE BLDC GLUCOMTR-MCNC: 134 MG/DL — HIGH (ref 70–99)
GLUCOSE SERPL-MCNC: 113 MG/DL — HIGH (ref 70–99)
HBA1C BLD-MCNC: 5.3 % — SIGNIFICANT CHANGE UP (ref 4–5.6)
HCT VFR BLD CALC: 25.4 % — LOW (ref 34.5–45)
HGB BLD-MCNC: 8.4 G/DL — LOW (ref 11.5–15.5)
MAGNESIUM SERPL-MCNC: 2 MG/DL — SIGNIFICANT CHANGE UP (ref 1.6–2.6)
MCHC RBC-ENTMCNC: 29.7 PG — SIGNIFICANT CHANGE UP (ref 27–34)
MCHC RBC-ENTMCNC: 33.1 GM/DL — SIGNIFICANT CHANGE UP (ref 32–36)
MCV RBC AUTO: 89.8 FL — SIGNIFICANT CHANGE UP (ref 80–100)
PHOSPHATE SERPL-MCNC: 3.2 MG/DL — SIGNIFICANT CHANGE UP (ref 2.5–4.5)
PLATELET # BLD AUTO: 277 K/UL — SIGNIFICANT CHANGE UP (ref 150–400)
POTASSIUM SERPL-MCNC: 4.2 MMOL/L — SIGNIFICANT CHANGE UP (ref 3.5–5.3)
POTASSIUM SERPL-SCNC: 4.2 MMOL/L — SIGNIFICANT CHANGE UP (ref 3.5–5.3)
RBC # BLD: 2.83 M/UL — LOW (ref 3.8–5.2)
RBC # FLD: 13.9 % — SIGNIFICANT CHANGE UP (ref 10.3–14.5)
SODIUM SERPL-SCNC: 131 MMOL/L — LOW (ref 135–145)
WBC # BLD: 11.12 K/UL — HIGH (ref 3.8–10.5)
WBC # FLD AUTO: 11.12 K/UL — HIGH (ref 3.8–10.5)

## 2018-01-30 PROCEDURE — 83935 ASSAY OF URINE OSMOLALITY: CPT

## 2018-01-30 PROCEDURE — 73070 X-RAY EXAM OF ELBOW: CPT

## 2018-01-30 PROCEDURE — 80048 BASIC METABOLIC PNL TOTAL CA: CPT

## 2018-01-30 PROCEDURE — 93005 ELECTROCARDIOGRAM TRACING: CPT | Mod: XU

## 2018-01-30 PROCEDURE — 99232 SBSQ HOSP IP/OBS MODERATE 35: CPT

## 2018-01-30 PROCEDURE — 73110 X-RAY EXAM OF WRIST: CPT

## 2018-01-30 PROCEDURE — 74177 CT ABD & PELVIS W/CONTRAST: CPT

## 2018-01-30 PROCEDURE — 99285 EMERGENCY DEPT VISIT HI MDM: CPT | Mod: 25

## 2018-01-30 PROCEDURE — 70450 CT HEAD/BRAIN W/O DYE: CPT

## 2018-01-30 PROCEDURE — 84100 ASSAY OF PHOSPHORUS: CPT

## 2018-01-30 PROCEDURE — 82570 ASSAY OF URINE CREATININE: CPT

## 2018-01-30 PROCEDURE — 85730 THROMBOPLASTIN TIME PARTIAL: CPT

## 2018-01-30 PROCEDURE — 73060 X-RAY EXAM OF HUMERUS: CPT

## 2018-01-30 PROCEDURE — 73090 X-RAY EXAM OF FOREARM: CPT

## 2018-01-30 PROCEDURE — 96374 THER/PROPH/DIAG INJ IV PUSH: CPT | Mod: XU

## 2018-01-30 PROCEDURE — 94640 AIRWAY INHALATION TREATMENT: CPT

## 2018-01-30 PROCEDURE — 81001 URINALYSIS AUTO W/SCOPE: CPT

## 2018-01-30 PROCEDURE — 99233 SBSQ HOSP IP/OBS HIGH 50: CPT

## 2018-01-30 PROCEDURE — 84300 ASSAY OF URINE SODIUM: CPT

## 2018-01-30 PROCEDURE — 31500 INSERT EMERGENCY AIRWAY: CPT

## 2018-01-30 PROCEDURE — 71045 X-RAY EXAM CHEST 1 VIEW: CPT | Mod: 26

## 2018-01-30 PROCEDURE — 83735 ASSAY OF MAGNESIUM: CPT

## 2018-01-30 PROCEDURE — 87086 URINE CULTURE/COLONY COUNT: CPT

## 2018-01-30 PROCEDURE — 82330 ASSAY OF CALCIUM: CPT

## 2018-01-30 PROCEDURE — 71260 CT THORAX DX C+: CPT

## 2018-01-30 PROCEDURE — 83036 HEMOGLOBIN GLYCOSYLATED A1C: CPT

## 2018-01-30 PROCEDURE — 80053 COMPREHEN METABOLIC PANEL: CPT

## 2018-01-30 PROCEDURE — 82550 ASSAY OF CK (CPK): CPT

## 2018-01-30 PROCEDURE — 96375 TX/PRO/DX INJ NEW DRUG ADDON: CPT | Mod: XU

## 2018-01-30 PROCEDURE — 97162 PT EVAL MOD COMPLEX 30 MIN: CPT

## 2018-01-30 PROCEDURE — 85610 PROTHROMBIN TIME: CPT

## 2018-01-30 PROCEDURE — 82962 GLUCOSE BLOOD TEST: CPT

## 2018-01-30 PROCEDURE — 73030 X-RAY EXAM OF SHOULDER: CPT

## 2018-01-30 PROCEDURE — 72125 CT NECK SPINE W/O DYE: CPT

## 2018-01-30 PROCEDURE — 71045 X-RAY EXAM CHEST 1 VIEW: CPT

## 2018-01-30 PROCEDURE — 85027 COMPLETE CBC AUTOMATED: CPT

## 2018-01-30 RX ORDER — SENNA PLUS 8.6 MG/1
2 TABLET ORAL
Qty: 0 | Refills: 0 | COMMUNITY
Start: 2018-01-30

## 2018-01-30 RX ORDER — OXYCODONE HYDROCHLORIDE 5 MG/1
1 TABLET ORAL
Qty: 20 | Refills: 0 | OUTPATIENT
Start: 2018-01-30 | End: 2018-02-03

## 2018-01-30 RX ORDER — ACETAMINOPHEN 500 MG
650 TABLET ORAL EVERY 6 HOURS
Qty: 0 | Refills: 0 | Status: DISCONTINUED | OUTPATIENT
Start: 2018-01-30 | End: 2018-01-30

## 2018-01-30 RX ORDER — OXYCODONE HYDROCHLORIDE 5 MG/1
1 TABLET ORAL
Qty: 0 | Refills: 0 | COMMUNITY
Start: 2018-01-30

## 2018-01-30 RX ORDER — ACETAMINOPHEN 500 MG
2 TABLET ORAL
Qty: 0 | Refills: 0 | COMMUNITY
Start: 2018-01-30

## 2018-01-30 RX ORDER — AZTREONAM 2 G
1 VIAL (EA) INJECTION
Qty: 6 | Refills: 0 | OUTPATIENT
Start: 2018-01-30 | End: 2018-02-01

## 2018-01-30 RX ORDER — TRAMADOL HYDROCHLORIDE 50 MG/1
25 TABLET ORAL
Qty: 0 | Refills: 0 | Status: DISCONTINUED | OUTPATIENT
Start: 2018-01-30 | End: 2018-01-30

## 2018-01-30 RX ORDER — TRAMADOL HYDROCHLORIDE 50 MG/1
0.5 TABLET ORAL
Qty: 0 | Refills: 0 | COMMUNITY
Start: 2018-01-30

## 2018-01-30 RX ORDER — CHOLECALCIFEROL (VITAMIN D3) 125 MCG
1000 CAPSULE ORAL
Qty: 0 | Refills: 0 | COMMUNITY
Start: 2018-01-30

## 2018-01-30 RX ORDER — DOCUSATE SODIUM 100 MG
1 CAPSULE ORAL
Qty: 0 | Refills: 0 | COMMUNITY
Start: 2018-01-30

## 2018-01-30 RX ADMIN — Medication 81 MILLIGRAM(S): at 12:24

## 2018-01-30 RX ADMIN — Medication 100 MILLIGRAM(S): at 13:03

## 2018-01-30 RX ADMIN — Medication 650 MILLIGRAM(S): at 12:35

## 2018-01-30 RX ADMIN — Medication 3 MILLILITER(S): at 12:24

## 2018-01-30 RX ADMIN — Medication 1 TABLET(S): at 06:54

## 2018-01-30 RX ADMIN — OXYCODONE HYDROCHLORIDE 5 MILLIGRAM(S): 5 TABLET ORAL at 14:13

## 2018-01-30 RX ADMIN — UMECLIDINIUM 1 PUFF(S): 62.5 AEROSOL, POWDER ORAL at 12:28

## 2018-01-30 RX ADMIN — Medication 100 MILLIGRAM(S): at 06:59

## 2018-01-30 RX ADMIN — OXYCODONE HYDROCHLORIDE 5 MILLIGRAM(S): 5 TABLET ORAL at 14:45

## 2018-01-30 RX ADMIN — Medication 30 MILLIGRAM(S): at 06:54

## 2018-01-30 RX ADMIN — Medication 3 MILLILITER(S): at 06:54

## 2018-01-30 RX ADMIN — LIDOCAINE 1 PATCH: 4 CREAM TOPICAL at 04:13

## 2018-01-30 RX ADMIN — LOSARTAN POTASSIUM 100 MILLIGRAM(S): 100 TABLET, FILM COATED ORAL at 06:53

## 2018-01-30 RX ADMIN — Medication 1000 UNIT(S): at 12:24

## 2018-01-30 RX ADMIN — Medication 650 MILLIGRAM(S): at 12:54

## 2018-01-30 RX ADMIN — ENOXAPARIN SODIUM 40 MILLIGRAM(S): 100 INJECTION SUBCUTANEOUS at 12:25

## 2018-01-30 RX ADMIN — LIDOCAINE 1 PATCH: 4 CREAM TOPICAL at 12:24

## 2018-01-30 NOTE — PROGRESS NOTE ADULT - ATTENDING COMMENTS
seen and examined 1/30/2018 @ 0838    left 5-9 displaced posterolateral rib fractures with hemothorax and pneumothorax  -pain controlled with Tylenol  -chest tube removed yesterday and small post-pull pneumothorax resolved on CXR this morning    -EDILMA placement seen and examined 1/30/2018 @ 0838    left 5-9 displaced posterolateral rib fractures with hemothorax and pneumothorax  -pain controlled with Tylenol  -chest tube removed yesterday and small post-pull pneumothorax resolved on CXR this morning    UTI with contaminated culture  -continue Bactrim  -repeat UCx    -EDILMA placement seen and examined 1/30/2018 @ 0872    left 5-9 displaced posterolateral rib fractures with hemothorax and pneumothorax  -pain controlled with Tylenol  -chest tube removed yesterday and small post-pull pneumothorax resolved on CXR this morning    UTI (PRESENT ON ADMISSION) with contaminated culture  -continue Bactrim  -repeat UCx    -EDILMA placement

## 2018-01-30 NOTE — DISCHARGE NOTE ADULT - CARE PLAN
Principal Discharge DX:	Hemopneumothorax on left  Goal:	Recover from hospital course  Assessment and plan of treatment:	Return to normal activities of daily living

## 2018-01-30 NOTE — PROGRESS NOTE ADULT - PROBLEM SELECTOR PLAN 4
with chronic hypoxic resp. failure and pulmonary HTN  1. Continue home medications as ordered.  2. O2 at 2L via nasal cannula.

## 2018-01-30 NOTE — DISCHARGE NOTE ADULT - NS AS ACTIVITY OBS
Walking-Outdoors allowed/Stairs allowed/No Heavy lifting/straining/Driving allowed/Walking-Indoors allowed/Do not drive while taking narcotic pain medication/Showering allowed

## 2018-01-30 NOTE — PROGRESS NOTE ADULT - PROBLEM SELECTOR PLAN 6
1. HISS coverage for now.  2. Close FS monitoring.  3. Diabetic diet.  4. Resume janumet on discharge (twice weekly dosing)

## 2018-01-30 NOTE — DISCHARGE NOTE ADULT - CARE PROVIDER_API CALL
Kian Pruitt), Surgery; Surgical Critical Care  [address] [id number]  Phone: [phone]  Fax: [phone] Kian Pruitt), Surgery; Surgical Critical Care  [address] [id number]  Phone: [phone]  Fax: [phone]    Mukund Quezada), Orthopaedic Surgery  [address] [place] [address] [address]  [place] [address]  Phone: [phone]  Fax: [phone]

## 2018-01-30 NOTE — DISCHARGE NOTE ADULT - MEDICATION SUMMARY - MEDICATIONS TO CHANGE
19:30 I will SWITCH the dose or number of times a day I take the medications listed below when I get home from the hospital:  None

## 2018-01-30 NOTE — DISCHARGE NOTE ADULT - CARE PROVIDERS DIRECT ADDRESSES
,[email or web address]

## 2018-01-30 NOTE — DISCHARGE NOTE ADULT - HOSPITAL COURSE
83yo female Mandarin and Cantonese-speaking from home, lives alone, walks with walker, former smoker, son is at bedside to translate, PMH TIA (8 years ago, no residual deficits, on ASA and plavix) p/w left flank/back pain s/p fall from her feet on to bath tub hitting her side. Pt was on the ground for roughly 45 minutes, was unable to get to her feet due to the pain in her back. Pain is reported to be 10/10, constant. Reports she may have been dizzy before the fall but can not quite remember. No head injury or LOC. No pain in any other area other than the left flank and left lumbar region. Pt denies cp, sob, headache, neck pain, flu like sx, recent travel or illness. 83yo female Mandarin and Cantonese-speaking from home, lives alone, walks with walker, former smoker, son is at bedside to translate, PMH TIA (8 years ago, no residual deficits, on ASA and plavix) p/w left flank/back pain s/p fall from her feet on to bath tub hitting her side. Pt was on the ground for roughly 45 minutes, was unable to get to her feet due to the pain in her back. Pain is reported to be 10/10, constant. Reports she may have been dizzy before the fall but can not quite remember. No head injury or LOC. No pain in any other area other than the left flank and left lumbar region. Pt denies cp, sob, headache, neck pain, flu like sx, recent travel or illness.  Primary and secondary surveys were obtained. Patient was admitted to [facility], SICU was consulted for respiratory monitoring and patient was transferred to the SICU.  Chest tube placed by ED and connected to suction, output was monitored      -CT to suction, monitor output    RADIOLOGICAL FINDINGS REVIEW:  Head CT: No evidence for calvarial fracture or acute intracranial   hemorrhage. If symptoms persist, consider short interval follow-up head   CT or brain MRI follow-up if there are no MRI contraindications.  CT spine: No evidence for acute cervical spine fracture.   Degenerative changes. If symptoms persist, consider cervical spine MRI   follow-up if there are no MRI contraindications. Flexion and extension   radiographs may also be of value.  Xray Wrist 3 Views, Left (01.27.18 @ 08:58)  1.  Chronic appearing transverse fracture of the midshaft of the left   humerus, with possible acute on chronic fracture of the left humeral   intramedullary roshan.  2.  Displaced fracture of the inferior edge of the left scapula, which is   better appreciated on recent CT chest.  3.  Left rib fractures as described above.  4.  Subcutaneous emphysema in the left axilla.    11/27/2018- Ortho was consulted for L scapular body fx and L midshaft humerus nonunion, and recommended NWB LUE in sling.  Patient denied any surgical intervention at this time.    11/28/2018- Pain well controlled pulling 500 on IS. Will send urine cx today. CHF restated home meds.  JOE will check urine electrolytes today but volume status appears euvolemic. 83yo female Mandarin and Cantonese-speaking from home, lives alone, walks with walker, former smoker, son is at bedside to translate, PMH TIA (8 years ago, no residual deficits, on ASA and plavix) p/w left flank/back pain s/p fall from her feet on to bath tub hitting her side. Pt was on the ground for roughly 45 minutes, was unable to get to her feet due to the pain in her back. Pain is reported to be 10/10, constant. Reports she may have been dizzy before the fall but can not quite remember. No head injury or LOC. No pain in any other area other than the left flank and left lumbar region. Pt denies cp, sob, headache, neck pain, flu like sx, recent travel or illness.  Primary and secondary surveys were obtained. Patient was admitted to ACS, SICU was consulted for respiratory monitoring and patient was transferred to the SICU.  Chest tube placed by ED and connected to suction, output was monitored and pain was controlled.    RADIOLOGICAL FINDINGS REVIEW:  Head CT: No evidence for calvarial fracture or acute intracranial   hemorrhage. If symptoms persist, consider short interval follow-up head   CT or brain MRI follow-up if there are no MRI contraindications.  CT spine: No evidence for acute cervical spine fracture.   Degenerative changes. If symptoms persist, consider cervical spine MRI   follow-up if there are no MRI contraindications. Flexion and extension   radiographs may also be of value.  Xray Wrist 3 Views, Left (01.27.18 @ 08:58)  1.  Chronic appearing transverse fracture of the midshaft of the left   humerus, with possible acute on chronic fracture of the left humeral   intramedullary roshan.  2.  Displaced fracture of the inferior edge of the left scapula, which is   better appreciated on recent CT chest.  3.  Left rib fractures as described above.  4.  Subcutaneous emphysema in the left axilla.    11/27/2018- Ortho was consulted for L scapular body fx and L midshaft humerus nonunion, and recommended NWB LUE in sling.  Patient denied any surgical intervention at this time.  Patient started on bactrim for + U/A.    11/28/2018- Pain well controlled pulling 500 on IS. Will send urine cx today. CHF restated home meds.  JOE urine electrolytes checked today but volume status appears euvolemic. Chest tube connected to water seal and transferred to surgical floor.    11/29/2018- chest tube discontinued today. f/u xray showed small apical PTX.  Home oxygen will be needed for pulmonary fibrosis.  Physical therapy saw patient and [facility].  Medicine consulted patient for diabetes management.    11/30/2018- AM xray was stable.  On day of discharge, the patient was tolerating diet, ambulating well and pain controlled. Patient will be discharged to [facility] with outpatient follow up with Dr. Pruitt. 81yo female Mandarin and Cantonese-speaking from home, lives alone, walks with walker, former smoker, son is at bedside to translate, PMH TIA (8 years ago, no residual deficits, on ASA and plavix) p/w left flank/back pain s/p fall from her feet on to bath tub hitting her side. Pt was on the ground for roughly 45 minutes, was unable to get to her feet due to the pain in her back. Pain is reported to be 10/10, constant. Reports she may have been dizzy before the fall but can not quite remember. No head injury or LOC. No pain in any other area other than the left flank and left lumbar region. Pt denies cp, sob, headache, neck pain, flu like sx, recent travel or illness.  Primary and secondary surveys were obtained. Patient was admitted to ACS, SICU was consulted for respiratory monitoring and patient was transferred to the SICU.  Chest tube placed by ED and connected to suction, output was monitored and pain was controlled.    RADIOLOGICAL FINDINGS REVIEW:  Head CT: No evidence for calvarial fracture or acute intracranial   hemorrhage. If symptoms persist, consider short interval follow-up head   CT or brain MRI follow-up if there are no MRI contraindications.  CT spine: No evidence for acute cervical spine fracture.   Degenerative changes. If symptoms persist, consider cervical spine MRI   follow-up if there are no MRI contraindications. Flexion and extension   radiographs may also be of value.  Xray Wrist 3 Views, Left (01.27.18 @ 08:58)  1.  Chronic appearing transverse fracture of the midshaft of the left   humerus, with possible acute on chronic fracture of the left humeral   intramedullary roshan.  2.  Displaced fracture of the inferior edge of the left scapula, which is   better appreciated on recent CT chest.  3.  Left rib fractures as described above.  4.  Subcutaneous emphysema in the left axilla.    11/27/2018- Ortho was consulted for L scapular body fx and L midshaft humerus nonunion, and recommended NWB LUE in sling.  Patient denied any surgical intervention at this time.  Patient started on bactrim for + U/A.    11/28/2018- Pain well controlled pulling 500 on IS. Will send urine cx today. CHF restated home meds.  JOE urine electrolytes checked today but volume status appears euvolemic. Chest tube connected to water seal and transferred to surgical floor.    11/29/2018- chest tube discontinued today. f/u xray showed small apical PTX.  Home oxygen will be needed for pulmonary fibrosis.  ASA was resumed.  [facility]  Medicine consulted patient for diabetes management.    11/30/2018- AM xray was stable.  On day of discharge, the patient was tolerating diet, ambulating well and pain controlled. Patient will be discharged to [facility] with Bactrim BID for 3 days, and will resume anticoagulation with ASA and plavix, with outpatient follow up with Dr. Pruitt and Dr. Quezada

## 2018-01-30 NOTE — DISCHARGE NOTE ADULT - INSTRUCTIONS
BATHING: Please do not submerge wound underwater. You may shower and/or sponge bathe.  ACTIVITY: No heavy lifting anything more than 10-15lbs or straining. Otherwise, you may return to your usual level of physical activity. If you are taking narcotic pain medication (such as Percocet), do NOT drive a car, operate machinery or make important decisions.  DIET: Low fiber diet  NOTIFY YOUR SURGEON IF: You have any bleeding that does not stop, any pus draining from your wound, any fever (over 100.4 F) or chills, persistent nausea/vomiting with inability to tolerate food or liquids, persistent diarrhea, or if your pain is not controlled on your discharge pain medications.  FOLLOW-UP:  1. Please call to make a follow-up appointment with Dr. Pruitt within 7-10 days.  2. Please follow up with your pulmonologist in 7-10 days. BATHING: Please do not submerge wound underwater. You may shower and/or sponge bathe.  ACTIVITY: No heavy lifting anything more than 10-15lbs or straining. Otherwise, you may return to your usual level of physical activity. If you are taking narcotic pain medication (such as Percocet), do NOT drive a car, operate machinery or make important decisions.  DIET: Low fiber diet  NOTIFY YOUR SURGEON IF: You have any bleeding that does not stop, any pus draining from your wound, any fever (over 100.4 F) or chills, persistent nausea/vomiting with inability to tolerate food or liquids, persistent diarrhea, or if your pain is not controlled on your discharge pain medications.  FOLLOW-UP:  1. Please call to make a follow-up appointment with Dr. Pruitt within 7-10 days.  2. Please follow up with your pulmonologist in 7-10 days.  3.  Please with f/u Dr. Quezada as outpatient. Call [phone] for appointment. Xeroform, gauze and tape over old chest tube site on Left.  Change QD. BATHING: Please do not submerge wound underwater. You may shower and/or sponge bathe.  ACTIVITY: No heavy lifting anything more than 10-15lbs or straining. Otherwise, you may return to your usual level of physical activity. If you are taking narcotic pain medication (such as Percocet), do NOT drive a car, operate machinery or make important decisions.  DIET: Low fiber diet  NOTIFY YOUR SURGEON IF: You have any bleeding that does not stop, any pus draining from your wound, any fever (over 100.4 F) or chills, persistent nausea/vomiting with inability to tolerate food or liquids, persistent diarrhea, or if your pain is not controlled on your discharge pain medications.  FOLLOW-UP:  1. Please call to make a follow-up appointment with Dr. Pruitt within 7-10 days.  2. Please follow up with your pulmonologist in 7-10 days.  3.  Please with f/u Dr. Quezada (ortho) as outpatient. Call [phone] for appointment. BATHING: Please do not submerge wound underwater. You may shower and/or sponge bathe.  ACTIVITY: No heavy lifting anything more than 10-15lbs or straining. Otherwise, you may return to your usual level of physical activity. If you are taking narcotic pain medication (such as Percocet), do NOT drive a car, operate machinery or make important decisions.  DIET: Low fiber diet  NOTIFY YOUR SURGEON IF: You have any bleeding that does not stop, any pus draining from your wound, any fever (over 100.4 F) or chills, persistent nausea/vomiting with inability to tolerate food or liquids, persistent diarrhea, or if your pain is not controlled on your discharge pain medications.  FOLLOW-UP:  1. Please call to make a follow-up appointment with Dr. Pruitt within 7-10 days.  2. Please follow up with your pulmonologist in 7-10 days.  3.  Please with f/u Dr. Quezada (ortho) as outpatient. Call [phone] for appointment.  4.  Please follow up with your primary care doctor regarding this hospital admission.

## 2018-01-30 NOTE — PROGRESS NOTE ADULT - PROBLEM SELECTOR PLAN 9
Likely chronic and mild, worsened by pain. Suspect SIADH. Can be worsened by Bactrim. Improving.  1. Holding IVF  2. BMP monitoring.  3. Encourage PO intake.  4. Pain control

## 2018-01-30 NOTE — PROGRESS NOTE ADULT - PROBLEM SELECTOR PLAN 1
1. Chest tube removed yesterday.  2. H/H has been stable.  3. Serial CXR imaging until apical PTX is resolved.  4. O2 support is at baseline.  5. Aspirin added back last night. Resume plavix on discharge.

## 2018-01-30 NOTE — PROGRESS NOTE ADULT - ASSESSMENT
83F s/p fall with left 5-9th rib fractures and left hemopneumothorax with CT, now removed and stable on the floor.  - Post pull apical pneumo, f/u CXR this morning  - Pain control  - OOB  - Incentive spirometry  - Dispo: EDILMA

## 2018-01-30 NOTE — PROGRESS NOTE ADULT - PROBLEM SELECTOR PLAN 2
Pain still not adequately controlled per family. Has not used PRN medications in last 24 hours.  1. Multimodal pain control with tylenol ATC, lidoderm patch, oxycodone PRN. Will add tramadol 25 mg PO BID, which can be uptitrated as tolerated. Monitor for anticholinergic side effects.  2. Avoid NSAIDs given renal insufficiency.  3. Continue senna/colace for bowel regimen.  4. Continue vitamin D 1000 IU daily.  5. Will need outpatient osteoporosis screening.

## 2018-01-30 NOTE — DISCHARGE NOTE ADULT - MEDICATION SUMMARY - MEDICATIONS TO TAKE
I will START or STAY ON the medications listed below when I get home from the hospital:    freetext medication  -  -- 801 milligram(s) by mouth 3 times a day  -- Indication: For Pulmonary fibrosis    acetaminophen 325 mg oral tablet  -- 2 tab(s) by mouth every 6 hours  -- Indication: For Pain    oxyCODONE 5 mg oral tablet  -- 1 tab(s) by mouth every 4 hours, As needed, Moderate Pain (4 - 6)  -- Indication: For Pain    aspirin 81 mg oral tablet, chewable  -- 1 tab(s) by mouth once a day, home & hosp  -- Indication: For Cardioprotection    losartan 100 mg oral tablet  -- 1 tab(s) by mouth once a day  -- Indication: For High blood pressure    Janumet 50 mg-500 mg oral tablet  -- 1 tab(s) by mouth 2 times a week  -- Indication: For Diabetes    simvastatin 40 mg oral tablet  -- 1 tab(s) by mouth once a day (at bedtime)  -- Indication: For High cholesterol    fenofibrate 67 mg oral capsule  -- 1 cap(s) by mouth once a day, home  -- Indication: For Osteoporosis    clopidogrel 75 mg oral tablet  -- 1 tab(s) by mouth once a day MDD:one  -- Indication: For Heart protection    Incruse Ellipta 62.5 mcg/inh inhalation powder  -- inhaled once a day  -- Indication: For Breathing    NIFEdipine 30 mg oral tablet, extended release  -- 1 tab(s) by mouth 2 times a day  -- Indication: For High blood pressure    docusate sodium 100 mg oral capsule  -- 1 cap(s) by mouth 3 times a day  -- Indication: For stool softener    senna oral tablet  -- 2 tab(s) by mouth once a day (at bedtime)  -- Indication: For stool softener    Esbriet 801 mg oral tablet  -- 1 tab(s) by mouth 3 times a day  -- Indication: For Pulmonary fibrosis    sulfamethoxazole-trimethoprim 800 mg-160 mg oral tablet  -- 1 tab(s) by mouth 2 times a day  -- Indication: For Urinary tract infection    cholecalciferol oral tablet  -- 1000 unit(s) by mouth once a day  -- Indication: For supplement

## 2018-01-30 NOTE — PROGRESS NOTE ADULT - PROBLEM SELECTOR PLAN 3
1. Continue vitamin D supplementation.  2. PT F/U.  3. Check orthostatics.  4. Monitor blood glucose.

## 2018-01-30 NOTE — PROGRESS NOTE ADULT - SUBJECTIVE AND OBJECTIVE BOX
GENERAL SURGERY DAILY PROGRESS NOTE    Subjective:  NAEO. Patient doing well this morning, no complaints.  Pain is well controlled.      Objective:  T(C): 36.4 (01-30-18 @ 04:58), Max: 36.9 (01-29-18 @ 21:28)  HR: 70 (01-30-18 @ 04:58) (63 - 85)  BP: 153/65 (01-30-18 @ 04:58) (97/59 - 153/65)  RR: 18 (01-30-18 @ 04:58) (18 - 18)  SpO2: 93% (01-30-18 @ 04:58) (90% - 98%)  Wt(kg): --    01-28 @ 07:01  -  01-29 @ 07:00  --------------------------------------------------------  IN:    lactated ringers.: 300 mL    Oral Fluid: 560 mL    sodium chloride 0.9%: 150 mL  Total IN: 1010 mL    OUT:    Chest Tube: 150 mL    Voided: 600 mL  Total OUT: 750 mL    Total NET: 260 mL      01-29 @ 07:01  -  01-30 @ 05:39  --------------------------------------------------------  IN:    Oral Fluid: 965 mL  Total IN: 965 mL    OUT:    Voided: 1750 mL  Total OUT: 1750 mL    Total NET: -785 mL        PE:  Gen: AAOX 3, NAD  Chest: dressing c,d,i.  Abd: Soft, NT, ND                          8.9    11.38 )-----------( 279      ( 29 Jan 2018 08:49 )             27.6     01-29    129<L>  |  96  |  31<H>  ----------------------------<  149<H>  4.3   |  23  |  1.40<H>    Ca    8.5      29 Jan 2018 08:52  Phos  2.7     01-29  Mg     2.1     01-29

## 2018-01-30 NOTE — PROGRESS NOTE ADULT - PROBLEM SELECTOR PLAN 10
Started on bactrim in SICU. Seems to be tolerating well, although may be contributing to hyponatremia.  1. Finishing bactrim course today.

## 2021-09-22 NOTE — DISCHARGE NOTE ADULT - NS AS DC STROKE ED MATERIALS
no... Stroke Education Booklet/Call 911 for Stroke/Prescribed Medications/Stroke Warning Signs and Symptoms/Risk Factors for Stroke/Need for Followup After Discharge

## 2023-03-30 NOTE — PATIENT PROFILE ADULT. - NSTOBACCONEVERSMOKERY/N_GEN_A
Yes, Non-Core measure site... Olumiant Pregnancy And Lactation Text: Based on animal studies, Olumiant may cause embryo-fetal harm when administered to pregnant women.  The medication should not be used in pregnancy.  Breastfeeding is not recommended during treatment.

## 2024-10-12 NOTE — H&P ADULT - TOTAL SCORE
Presents with cough productive of yellow sputum, sore throat, and shortness of breath. Afebrile (Tmax 100.1 F) in the ED. Leukocytosis to 14.89. CT Chest revealed new ill-defined peribronchovascular groundglass nodular opacities   throughout both lungs, likely infectious. Sepsis given patient met SIRS criteria in the setting of pneumonia   MRSA positive. Legionella, Strep, RVP negative. Sputum culture showed no growth. Blood culturesx2 negative   -Per ID, start ertapenem. Premedicate with benadryl prior to ertapenem per pt request   -Will trend WBC count daily   -Low threshold to start vancomycin for MRSA coverage given recent hospitalization if decompensates  -procalcitonin 12